# Patient Record
Sex: MALE | Race: WHITE | Employment: UNEMPLOYED | URBAN - METROPOLITAN AREA
[De-identification: names, ages, dates, MRNs, and addresses within clinical notes are randomized per-mention and may not be internally consistent; named-entity substitution may affect disease eponyms.]

---

## 2018-05-04 ENCOUNTER — HOSPITAL ENCOUNTER (INPATIENT)
Age: 38
LOS: 6 days | Discharge: HOME | DRG: 523 | End: 2018-05-10
Attending: EMERGENCY MEDICINE | Admitting: PSYCHIATRY & NEUROLOGY
Payer: COMMERCIAL

## 2018-05-04 DIAGNOSIS — R74.8 ELEVATED LIVER ENZYMES: ICD-10-CM

## 2018-05-04 DIAGNOSIS — F10.10 ALCOHOL ABUSE: Primary | ICD-10-CM

## 2018-05-04 DIAGNOSIS — E87.6 HYPOKALEMIA: ICD-10-CM

## 2018-05-04 LAB
ALBUMIN SERPL-MCNC: 4 G/DL (ref 3.4–5)
ALBUMIN/GLOB SERPL: 1.3 {RATIO} (ref 0.8–1.7)
ALP SERPL-CCNC: 97 U/L (ref 45–117)
ALT SERPL-CCNC: 66 U/L (ref 16–61)
AMPHET UR QL SCN: NEGATIVE
ANION GAP SERPL CALC-SCNC: 10 MMOL/L (ref 3–18)
APAP SERPL-MCNC: <2 UG/ML (ref 10–30)
AST SERPL-CCNC: 62 U/L (ref 15–37)
BARBITURATES UR QL SCN: NEGATIVE
BASOPHILS # BLD: 0 K/UL (ref 0–0.1)
BASOPHILS NFR BLD: 0 % (ref 0–2)
BENZODIAZ UR QL: NEGATIVE
BILIRUB SERPL-MCNC: 0.9 MG/DL (ref 0.2–1)
BUN SERPL-MCNC: 7 MG/DL (ref 7–18)
BUN/CREAT SERPL: 9 (ref 12–20)
CALCIUM SERPL-MCNC: 8.2 MG/DL (ref 8.5–10.1)
CANNABINOIDS UR QL SCN: POSITIVE
CHLORIDE SERPL-SCNC: 102 MMOL/L (ref 100–108)
CO2 SERPL-SCNC: 26 MMOL/L (ref 21–32)
COCAINE UR QL SCN: NEGATIVE
CREAT SERPL-MCNC: 0.8 MG/DL (ref 0.6–1.3)
DIFFERENTIAL METHOD BLD: ABNORMAL
EOSINOPHIL # BLD: 0 K/UL (ref 0–0.4)
EOSINOPHIL NFR BLD: 0 % (ref 0–5)
ERYTHROCYTE [DISTWIDTH] IN BLOOD BY AUTOMATED COUNT: 12.3 % (ref 11.6–14.5)
ETHANOL SERPL-MCNC: 139 MG/DL (ref 0–3)
GLOBULIN SER CALC-MCNC: 3.1 G/DL (ref 2–4)
GLUCOSE SERPL-MCNC: 147 MG/DL (ref 74–99)
HCT VFR BLD AUTO: 40.2 % (ref 36–48)
HDSCOM,HDSCOM: ABNORMAL
HGB BLD-MCNC: 14.5 G/DL (ref 13–16)
LYMPHOCYTES # BLD: 0.8 K/UL (ref 0.9–3.6)
LYMPHOCYTES NFR BLD: 12 % (ref 21–52)
MCH RBC QN AUTO: 33.4 PG (ref 24–34)
MCHC RBC AUTO-ENTMCNC: 36.1 G/DL (ref 31–37)
MCV RBC AUTO: 92.6 FL (ref 74–97)
METHADONE UR QL: NEGATIVE
MONOCYTES # BLD: 0.5 K/UL (ref 0.05–1.2)
MONOCYTES NFR BLD: 9 % (ref 3–10)
NEUTS SEG # BLD: 5 K/UL (ref 1.8–8)
NEUTS SEG NFR BLD: 79 % (ref 40–73)
OPIATES UR QL: NEGATIVE
PCP UR QL: NEGATIVE
PLATELET # BLD AUTO: 204 K/UL (ref 135–420)
PMV BLD AUTO: 9.3 FL (ref 9.2–11.8)
POTASSIUM SERPL-SCNC: 3.1 MMOL/L (ref 3.5–5.5)
PROT SERPL-MCNC: 7.1 G/DL (ref 6.4–8.2)
RBC # BLD AUTO: 4.34 M/UL (ref 4.7–5.5)
SALICYLATES SERPL-MCNC: <2.8 MG/DL (ref 2.8–20)
SODIUM SERPL-SCNC: 138 MMOL/L (ref 136–145)
WBC # BLD AUTO: 6.3 K/UL (ref 4.6–13.2)

## 2018-05-04 PROCEDURE — 80307 DRUG TEST PRSMV CHEM ANLYZR: CPT | Performed by: PHYSICIAN ASSISTANT

## 2018-05-04 PROCEDURE — 85025 COMPLETE CBC W/AUTO DIFF WBC: CPT | Performed by: PHYSICIAN ASSISTANT

## 2018-05-04 PROCEDURE — 74011250636 HC RX REV CODE- 250/636: Performed by: STUDENT IN AN ORGANIZED HEALTH CARE EDUCATION/TRAINING PROGRAM

## 2018-05-04 PROCEDURE — 96374 THER/PROPH/DIAG INJ IV PUSH: CPT

## 2018-05-04 PROCEDURE — 93005 ELECTROCARDIOGRAM TRACING: CPT

## 2018-05-04 PROCEDURE — 99285 EMERGENCY DEPT VISIT HI MDM: CPT

## 2018-05-04 PROCEDURE — 80053 COMPREHEN METABOLIC PANEL: CPT | Performed by: PHYSICIAN ASSISTANT

## 2018-05-04 PROCEDURE — 65220000005 HC RM SEMIPRIVATE PSYCH 3 OR 4 BED

## 2018-05-04 RX ORDER — LANOLIN ALCOHOL/MO/W.PET/CERES
100 CREAM (GRAM) TOPICAL DAILY
Status: DISCONTINUED | OUTPATIENT
Start: 2018-05-05 | End: 2018-05-10 | Stop reason: HOSPADM

## 2018-05-04 RX ORDER — HYDROXYZINE PAMOATE 50 MG/1
50 CAPSULE ORAL
Status: DISCONTINUED | OUTPATIENT
Start: 2018-05-04 | End: 2018-05-10 | Stop reason: HOSPADM

## 2018-05-04 RX ORDER — TRAZODONE HYDROCHLORIDE 50 MG/1
50 TABLET ORAL
Status: DISCONTINUED | OUTPATIENT
Start: 2018-05-04 | End: 2018-05-10 | Stop reason: HOSPADM

## 2018-05-04 RX ORDER — BENZTROPINE MESYLATE 1 MG/ML
1 INJECTION INTRAMUSCULAR; INTRAVENOUS
Status: DISCONTINUED | OUTPATIENT
Start: 2018-05-04 | End: 2018-05-10 | Stop reason: HOSPADM

## 2018-05-04 RX ORDER — CHLORDIAZEPOXIDE HYDROCHLORIDE 25 MG/1
25 CAPSULE, GELATIN COATED ORAL
Status: DISCONTINUED | OUTPATIENT
Start: 2018-05-04 | End: 2018-05-10 | Stop reason: HOSPADM

## 2018-05-04 RX ORDER — HALOPERIDOL 5 MG/ML
5 INJECTION INTRAMUSCULAR
Status: DISCONTINUED | OUTPATIENT
Start: 2018-05-04 | End: 2018-05-10 | Stop reason: HOSPADM

## 2018-05-04 RX ORDER — FOLIC ACID 1 MG/1
1 TABLET ORAL DAILY
Status: DISCONTINUED | OUTPATIENT
Start: 2018-05-05 | End: 2018-05-10 | Stop reason: HOSPADM

## 2018-05-04 RX ORDER — LORAZEPAM 2 MG/ML
1 INJECTION INTRAMUSCULAR ONCE
Status: COMPLETED | OUTPATIENT
Start: 2018-05-04 | End: 2018-05-04

## 2018-05-04 RX ORDER — POTASSIUM CHLORIDE 20 MEQ/1
40 TABLET, EXTENDED RELEASE ORAL
Status: DISPENSED | OUTPATIENT
Start: 2018-05-04 | End: 2018-05-05

## 2018-05-04 RX ORDER — BENZTROPINE MESYLATE 1 MG/1
1 TABLET ORAL
Status: DISCONTINUED | OUTPATIENT
Start: 2018-05-04 | End: 2018-05-10 | Stop reason: HOSPADM

## 2018-05-04 RX ORDER — CHLORDIAZEPOXIDE HYDROCHLORIDE 25 MG/1
50 CAPSULE, GELATIN COATED ORAL
Status: DISCONTINUED | OUTPATIENT
Start: 2018-05-04 | End: 2018-05-10 | Stop reason: HOSPADM

## 2018-05-04 RX ORDER — HALOPERIDOL 5 MG/1
5 TABLET ORAL
Status: DISCONTINUED | OUTPATIENT
Start: 2018-05-04 | End: 2018-05-10 | Stop reason: HOSPADM

## 2018-05-04 RX ADMIN — LORAZEPAM 1 MG: 2 INJECTION, SOLUTION INTRAMUSCULAR; INTRAVENOUS at 15:53

## 2018-05-04 NOTE — IP AVS SNAPSHOT
[address] Patient: Lucille Hoffman MRN: [id number] About your hospitalization You were admitted on: May 4, 2018 You last received care in the:  [facility] 1 ADULT CHEM DEP You were discharged on:  May 10, 2018 Why you were hospitalized Your primary diagnosis was:  Depressive Disorder Your diagnoses also included:  Alcohol Use Disorder, Severe, Dependence (Hcc), Alcohol Withdrawal, Uncomplicated (Hcc), Cannabis Use Disorder, Mild, Abuse, Tobacco Use Disorder, Moderate, Dependence Follow-up Information Follow up With Details Comments Contact Harmony OZUNA [address] On 5/14/2018 with Greta Moise LCSW at 3:45pm. Medication appointment will be made after initial appointment. [address] [address] 
[phone] Discharge Orders None A check eduardo indicates which time of day the medication should be taken. My Medications START taking these medications Instructions Each Dose to Equal  
 Morning Noon Evening Bedtime  
 acamprosate 333 mg tablet Commonly known as:  Donnamaria Ponds Your last dose was: Your next dose is: Take 1 Tab by mouth three (3) times daily. Indications: alcoholism 333 mg  
    
   
   
   
  
 folic acid 1 mg tablet Commonly known as:  Google Your last dose was: Your next dose is: Take 1 Tab by mouth daily. Indications: Folate Deficiency 1 mg  
    
   
   
   
  
 gabapentin 400 mg capsule Commonly known as:  NEURONTIN Your last dose was: Your next dose is: Take 1 Cap by mouth three (3) times daily. Indications: alcoholism 400 mg  
    
   
   
   
  
 hydrOXYzine pamoate 50 mg capsule Commonly known as:  VISTARIL Your last dose was: Your next dose is: Take 1 Cap by mouth three (3) times daily as needed for Anxiety for up to 14 days. Indications: anxiety 50 mg  
    
   
   
   
  
 multivitamin, tx-iron-ca-min 9 mg iron-400 mcg Tab tablet Commonly known as:  THERA-M w/ IRON Your last dose was: Your next dose is: Take 1 Tab by mouth daily. Indications: MINERAL DEFICIENCY PREVENTION  
 1 Tab  
    
   
   
   
  
 sertraline 100 mg tablet Commonly known as:  ZOLOFT Your last dose was: Your next dose is: Take 1 Tab by mouth nightly. Indications: ANXIETY WITH DEPRESSION  
 100 mg  
    
   
   
   
  
 thiamine 100 mg tablet Commonly known as:  B-1 Your last dose was: Your next dose is: Take 1 Tab by mouth daily. Indications: THIAMINE DEFICIENCY  
 100 mg  
    
   
   
   
  
 traZODone 50 mg tablet Commonly known as:  Rahul Bell Your last dose was: Your next dose is: Take 1 Tab by mouth nightly as needed for Sleep. Indications: insomnia associated with depression 50 mg Where to Get Your Medications Information on where to get these meds will be given to you by the nurse or doctor. ! Ask your nurse or doctor about these medications  
  acamprosate 766 mg tablet  
 folic acid 1 mg tablet  
 gabapentin 400 mg capsule  
 hydrOXYzine pamoate 50 mg capsule  
 multivitamin, tx-iron-ca-min 9 mg iron-400 mcg Tab tablet  
 sertraline 100 mg tablet  
 thiamine 100 mg tablet  
 traZODone 50 mg tablet Discharge Instructions BEHAVIORAL HEALTH NURSING DISCHARGE NOTE Emergency Numbers [facility] Desk: [phone] [facility] Emergency Services: [phone] Suicide Prevention Line: [phone] (TALK) The following personal items collected during your admission are returned to you:  
Dental Appliance: Dental Appliances: None Vision: Visual Aid: None Hearing Aid:   
 Jewelry: Jewelry: Necklace, Ring, With patient, Watch (locker 126/4) Clothing: Clothing: Shirt, Pants, Footwear Other Valuables: Other Valuables: Cell Phone, INFOGRAPHIQS (locker 126/4) Valuables sent to safe: Personal Items Sent to Safe: six credit cards, twenty dollar cash The discharge information has been reviewed with the patient. The patient verbalized understanding. Orggerhart Activation Thank you for requesting access to Work in Field. Please follow the instructions below to securely access and download your online medical record. Work in Field allows you to send messages to your doctor, view your test results, renew your prescriptions, schedule appointments, and more. How Do I Sign Up? In your internet browser, go to [email or web address] Click on the First Time User? Click Here link in the Sign In box. You will be redirect to the New Member Sign Up page. Enter your Work in Field Access Code exactly as it appears below. You will not need to use this code after youve completed the sign-up process. If you do not sign up before the expiration date, you must request a new code. Work in Field Access Code: [email or web address] Expires: 2018  2:15 PM (This is the date your Work in Field access code will ) Enter the last four digits of your Social Security Number (xxxx) and Date of Birth (mm/dd/yyyy) as indicated and click Submit. You will be taken to the next sign-up page. Create a Work in Field ID. This will be your Work in Field login ID and cannot be changed, so think of one that is secure and easy to remember. Create a Work in Field password. You can change your password at any time. Enter your Password Reset Question and Answer. This can be used at a later time if you forget your password. Enter your e-mail address. You will receive e-mail notification when new information is available in [address] Click Sign Up. You can now view and download portions of your medical record. Click the kontoblick link to download a portable copy of your medical information. Additional Information If you have questions, please visit the Frequently Asked Questions section of the [employer] Social Platform website at [email or web address] Remember, [employer] is NOT to be used for urgent needs. For medical emergencies, dial 911. Patient armband removed and shredded TransferWiseharLabtiva Announcement We are excited to announce that we are making your provider's discharge notes available to you in [employer] Social Platform. You will see these notes when they are completed and signed by the physician that discharged you from your recent hospital stay. If you have any questions or concerns about any information you see in [employer] Social Platform, please call the Health Information Department where you were seen or reach out to your Primary Care Provider for more information about your plan of care. Introducing [facility]! [employer] introduces [employer] Social Platform patient portal. Now you can access parts of your medical record, email your doctor's office, and request medication refills online. 1. In your internet browser, go to [email or web address] 2. Click on the First Time User? Click Here link in the Sign In box. You will see the New Member Sign Up page. 3. Enter your [employer] Platform Access Code exactly as it appears below. You will not need to use this code after youve completed the sign-up process. If you do not sign up before the expiration date, you must request a new code. · [employer] Access Code: [email or web address] Expires: 8/2/2018  2:15 PM 
 
4. Enter the last four digits of your Social Security Number (xxxx) and Date of Birth (mm/dd/yyyy) as indicated and click Submit. You will be taken to the next sign-up page. 5. Create a [employer] Platform ID. This will be your [employer] Platform login ID and cannot be changed, so think of one that is secure and easy to remember. 6. Create a [employer] password. You can change your password at any time. 7. Enter your Password Reset Question and Answer. This can be used at a later time if you forget your password. 8. Enter your e-mail address. You will receive e-mail notification when new information is available in [address] 9. Click Sign Up. You can now view and download portions of your medical record. 10. Click the Download Summary menu link to download a portable copy of your medical information. If you have questions, please visit the Frequently Asked Questions section of the [employer] website. Remember, [employer] is NOT to be used for urgent needs. For medical emergencies, dial 911. Now available from your iPhone and Android! Introducing Zaki Martinez As a Benuel Kamille patient, I wanted to make you aware of our electronic visit tool called Zaki Martinez. [facility] WordWatch 24/7 allows you to connect within minutes with a medical provider 24 hours a day, seven days a week via a mobile device or tablet or logging into a secure website from your computer. You can access Zaki Martinez from anywhere in the [place]. A virtual visit might be right for you when you have a simple condition and feel like you just dont want to get out of bed, or cant get away from work for an appointment, when your regular Benuel Kamille provider is not available (evenings, weekends or holidays), or when youre out of town and need minor care. Electronic visits cost only $49 and if the FiveCubits 24/7 provider determines a prescription is needed to treat your condition, one can be electronically transmitted to a nearby pharmacy*. Please take a moment to enroll today if you have not already done so. The enrollment process is free and takes just a few minutes. To enroll, please download the FiveCubits 24/PingCo.com jessenia to your tablet or phone, or visit [email or web address] to enroll on your computer. And, as an [address] [place] [address] patient with a Studio Whale account, the results of your visits will be scanned into your electronic medical record and your primary care provider will be able to view the scanned results. We urge you to continue to see your regular Murcathy Bardales provider for your ongoing medical care. And while your primary care provider may not be the one available when you seek a Zaki Riddlecassfin virtual visit, the peace of mind you get from getting a real diagnosis real time can be priceless. For more information on Zaki Bravofin, view our Frequently Asked Questions (FAQs) at [email or web address]. Sincerely, 
 
Rupesh Levy MD 
Chief Medical Officer [address] Clark *:  certain medications cannot be prescribed via Zaki Riddlecassfin Providers Seen During Your Hospitalization Provider Specialty Primary office phone Pascual Garcia MD Emergency Medicine [phone] Carlos Troncoso MD Psychiatry [phone] Your Primary Care Physician (PCP) Primary Care Physician Office Phone Office Fax NONE ** None ** ** None ** You are allergic to the following No active allergies Recent Documentation Height Weight BMI  
  
  
 1.829 m 61.2 kg 18.31 kg/m2 Emergency Contacts Name Discharge Info Relation Home Work Mobile Yariel KOHLI CAREGIVER [4] Mother [14] [phone] Patient Belongings The following personal items are in your possession at time of discharge: 
  Dental Appliances: None  Visual Aid: None      Home Medications: None   Jewelry: Necklace, Ring, With patient, Watch (locker 126/4)  Clothing: Shirt, Pants, Footwear    Other Valuables: Cell Phone, Misti Garg (locker 126/4)  Personal Items Sent to Safe: six credit cards, twenty dollar cash Please provide this summary of care documentation to your next provider. Signatures-by signing, you are acknowledging that this After Visit Summary has been reviewed with you and you have received a copy. Patient Signature:  ____________________________________________________________ Date:  ____________________________________________________________  
  
Gwendloyn Finger Provider Signature:  ____________________________________________________________ Date:  ____________________________________________________________

## 2018-05-04 NOTE — ED TRIAGE NOTES
Patient presents to the ED stating he needs to detox from alcohol. Patient's last drink was last night. Patient also smokes Annmarie Cooks  and takes ativan.

## 2018-05-04 NOTE — BH NOTES
Patient arrived to unit safely via wheelchair. VSS and A&Ox 4. Admission paperwork and treatment team process was reviewed with patient; signed in acknowledgement. Patient's belongings were inventoried and checked for safety; signed in agreement/acknowledgement by patient. Patient contracted for safety in regards to withdrawal process. Patient is a 39 y/o male with a hx of alcohol abuse and dependence; no further medical or psychiatric hx. Report spinal issue of his \" C5-6 rubbing and lower back pain\"; uses Ibuprofen and ice at home. Denied current legal issues; hx of DUIs over 9 years ago. Patient also attended Alcoholics anonymous at that time. Patient is not currently taking any home medications, but reports being previously on celexa. Patient reports increased depression and drinking after losing his job (worked 20 years) and his long term relationship. Patient denied SI/HI and AVH. Patient reports drinking a fifth to a ninth of vodka daily and marijuana use; UDS + for THC and Ethyl serum 139. Patient denies hx of seizures. Patient acknowledges his friends and family as a support system, and sees his new job (for 1 month) as inspiration for his sobriety.

## 2018-05-04 NOTE — ROUTINE PROCESS
TRANSFER - OUT REPORT:    Verbal report given to Alexsander Shaw RN(name) on Lesli Damian  being transferred to Adult Unit(unit) for routine progression of care       Report consisted of patients Situation, Background, Assessment and   Recommendations(SBAR). Information from the following report(s) SBAR, ED Summary, Intake/Output, MAR and Recent Results was reviewed with the receiving nurse. Lines:   Peripheral IV 05/04/18 Left Forearm (Active)   Site Assessment Clean, dry, & intact 5/4/2018  3:14 PM   Phlebitis Assessment 0 5/4/2018  3:14 PM   Infiltration Assessment 0 5/4/2018  3:14 PM   Dressing Status Clean, dry, & intact 5/4/2018  3:14 PM   Dressing Type Transparent 5/4/2018  3:14 PM   Hub Color/Line Status Flushed;Patent 5/4/2018  3:14 PM        Opportunity for questions and clarification was provided.       Patient transported with:   Tech   Patient kristina Smith

## 2018-05-04 NOTE — ED PROVIDER NOTES
EMERGENCY DEPARTMENT HISTORY AND PHYSICAL EXAM    2:23 PM      Date: 5/4/2018  Patient Name: Ailyn Villagran    History of Presenting Illness     Chief Complaint   Patient presents with    Alcohol Problem         History Provided By: Patient    Chief Complaint: alcohol withdrawal   Duration:  Hours  Timing:  Acute  Location: N/A  Quality: N/A  Severity: Moderate  Modifying Factors: alcohol  Associated Symptoms: denies any other associated signs or symptoms      Additional History (Context): Ailyn Villagran is a 40 y.o. male with alcohol abuse who presents with desire for alcohol detox. Drinks 1 pint to one half gallon of vodka a day and has for 5+ months, almost every day before that point. Denies active SI but states he drinks because he's depressed about his life, his job, and his relationship with his woman. Does agree to passive suicidal ideation. Does not own a gun, does not have prior SI or active plan. Denies HI. Endorses marijuana use but declines other drug use. PCP: None        Past History     Past Medical History:  No past medical history on file. Past Surgical History:  No past surgical history on file. Family History:  No family history on file. Social History:  Social History   Substance Use Topics    Smoking status: Not on file    Smokeless tobacco: Not on file    Alcohol use Not on file       Allergies:  No Known Allergies      Review of Systems       Review of Systems   Constitutional: Positive for fatigue. Negative for activity change, appetite change, chills, diaphoresis and fever. HENT: Negative for congestion, sneezing, sore throat, trouble swallowing and voice change. Eyes: Negative for photophobia. Respiratory: Negative for cough, chest tightness, shortness of breath, wheezing and stridor. Cardiovascular: Positive for palpitations. Negative for chest pain and leg swelling.    Gastrointestinal: Negative for abdominal distention, abdominal pain, blood in stool, constipation, diarrhea, nausea and vomiting. Endocrine: Negative for polydipsia and polyphagia. Genitourinary: Negative for difficulty urinating, flank pain, frequency and urgency. Musculoskeletal: Negative for gait problem and myalgias. Skin: Negative for pallor, rash and wound. Allergic/Immunologic: Negative for immunocompromised state. Neurological: Positive for tremors. Negative for dizziness, seizures, light-headedness, numbness and headaches. Hematological: Negative for adenopathy. Psychiatric/Behavioral: Positive for agitation. Physical Exam     Visit Vitals    BP (!) 142/94    Pulse (!) 112    Temp 98.9 °F (37.2 °C)    Resp 16    Ht 6' 1\" (1.854 m)    Wt 61.2 kg (135 lb)    SpO2 97%    BMI 17.81 kg/m2       Physical Exam   Constitutional: He is oriented to person, place, and time. No distress. HENT:   Head: Normocephalic and atraumatic. Mouth/Throat: No oropharyngeal exudate. Eyes: Pupils are equal, round, and reactive to light. Right eye exhibits no discharge. Left eye exhibits no discharge. Neck: Normal range of motion. No tracheal deviation present. Cardiovascular: Regular rhythm, normal heart sounds, intact distal pulses and normal pulses. Tachycardia present. Pulmonary/Chest: Effort normal. No stridor. No respiratory distress. He has no wheezes. He has no rales. He exhibits no tenderness. Abdominal: Soft. Bowel sounds are normal. He exhibits no distension. There is no tenderness. There is no rebound and no guarding. Musculoskeletal: Normal range of motion. He exhibits no edema, tenderness or deformity. Neurological: He is alert and oriented to person, place, and time. He displays normal reflexes. No cranial nerve deficit. Coordination normal.   Skin: Skin is warm and dry. No rash noted. He is not diaphoretic. Psychiatric: He has a normal mood and affect.          Diagnostic Study Results     Labs -  Recent Results (from the past 12 hour(s))   DRUG SCREEN, URINE Collection Time: 05/04/18  2:19 PM   Result Value Ref Range    BENZODIAZEPINES NEGATIVE  NEG      BARBITURATES NEGATIVE  NEG      THC (TH-CANNABINOL) POSITIVE (A) NEG      OPIATES NEGATIVE  NEG      PCP(PHENCYCLIDINE) NEGATIVE  NEG      COCAINE NEGATIVE  NEG      AMPHETAMINES NEGATIVE  NEG      METHADONE NEGATIVE  NEG      HDSCOM (NOTE)    EKG, 12 LEAD, INITIAL    Collection Time: 05/04/18  2:51 PM   Result Value Ref Range    Ventricular Rate 98 BPM    Atrial Rate 98 BPM    P-R Interval 134 ms    QRS Duration 108 ms    Q-T Interval 344 ms    QTC Calculation (Bezet) 439 ms    Calculated P Axis 71 degrees    Calculated R Axis 70 degrees    Calculated T Axis 64 degrees    Diagnosis       Normal sinus rhythm  Normal ECG  No previous ECGs available     CBC WITH AUTOMATED DIFF    Collection Time: 05/04/18  3:00 PM   Result Value Ref Range    WBC 6.3 4.6 - 13.2 K/uL    RBC 4.34 (L) 4.70 - 5.50 M/uL    HGB 14.5 13.0 - 16.0 g/dL    HCT 40.2 36.0 - 48.0 %    MCV 92.6 74.0 - 97.0 FL    MCH 33.4 24.0 - 34.0 PG    MCHC 36.1 31.0 - 37.0 g/dL    RDW 12.3 11.6 - 14.5 %    PLATELET 498 027 - 404 K/uL    MPV 9.3 9.2 - 11.8 FL    NEUTROPHILS 79 (H) 40 - 73 %    LYMPHOCYTES 12 (L) 21 - 52 %    MONOCYTES 9 3 - 10 %    EOSINOPHILS 0 0 - 5 %    BASOPHILS 0 0 - 2 %    ABS. NEUTROPHILS 5.0 1.8 - 8.0 K/UL    ABS. LYMPHOCYTES 0.8 (L) 0.9 - 3.6 K/UL    ABS. MONOCYTES 0.5 0.05 - 1.2 K/UL    ABS. EOSINOPHILS 0.0 0.0 - 0.4 K/UL    ABS.  BASOPHILS 0.0 0.0 - 0.1 K/UL    DF AUTOMATED     METABOLIC PANEL, COMPREHENSIVE    Collection Time: 05/04/18  3:00 PM   Result Value Ref Range    Sodium 138 136 - 145 mmol/L    Potassium 3.1 (L) 3.5 - 5.5 mmol/L    Chloride 102 100 - 108 mmol/L    CO2 26 21 - 32 mmol/L    Anion gap 10 3.0 - 18 mmol/L    Glucose 147 (H) 74 - 99 mg/dL    BUN 7 7.0 - 18 MG/DL    Creatinine 0.80 0.6 - 1.3 MG/DL    BUN/Creatinine ratio 9 (L) 12 - 20      GFR est AA >60 >60 ml/min/1.73m2    GFR est non-AA >60 >60 ml/min/1.73m2    Calcium 8.2 (L) 8.5 - 10.1 MG/DL    Bilirubin, total 0.9 0.2 - 1.0 MG/DL    ALT (SGPT) 66 (H) 16 - 61 U/L    AST (SGOT) 62 (H) 15 - 37 U/L    Alk. phosphatase 97 45 - 117 U/L    Protein, total 7.1 6.4 - 8.2 g/dL    Albumin 4.0 3.4 - 5.0 g/dL    Globulin 3.1 2.0 - 4.0 g/dL    A-G Ratio 1.3 0.8 - 1.7     SALICYLATE    Collection Time: 05/04/18  3:00 PM   Result Value Ref Range    Salicylate level <9.3 (L) 2.8 - 20.0 MG/DL   ACETAMINOPHEN    Collection Time: 05/04/18  3:00 PM   Result Value Ref Range    Acetaminophen level <2 (L) 10.0 - 30.0 ug/mL   ETHYL ALCOHOL    Collection Time: 05/04/18  3:00 PM   Result Value Ref Range    ALCOHOL(ETHYL),SERUM 139 (H) 0 - 3 MG/DL       Radiologic Studies -   No orders to display         Medical Decision Making   I am the first provider for this patient. I reviewed the vital signs, available nursing notes, past medical history, past surgical history, family history and social history. Vital Signs-Reviewed the patient's vital signs. Pulse Oximetry Analysis -  99 on room air     Cardiac Monitor:  Rate: 100s  Rhythm:  Sinus Tachycardia    EKG: Interpreted by the EP. No acute ischemic changes. Records Reviewed: Nursing Notes (Time of Review: 2:23 PM)    ED Course: Progress Notes, Reevaluation, and Consults:    Provider Notes (Medical Decision Making): 39 yo M without significant PMH but with chronic alcohol use and abuse (1 pint vodka daily for 6+ months) no prior history of complex withdrawal presenting with desire for detox in the setting of stopping EtOH last night (last drink ~13 hours prior.)     Will eval with CBC, CMP, EtOH, UDS, tylenol and aspirin levels, ECG. Treat symptomatically with ativan. Given no history of complex withdrawal, no cardiac disease, younger age, no major electrolyte abnormalities, significant GI or renal path, suspect this patient is low risk for complex withdrawal syndrome. 5:17 PM  Labs notable for mild hypoK, EtOH 140s. K repleted orally. Patient to be admitted by psych. Medically stable. Spoke to Dr. Aamir Ty about oral repletion and possibly adding oral potassium to diet if still low on recheck. For Hospitalized Patients:    1. Hospitalization Decision Time:  The decision to hospitalize the patient was made by Dr. Fabiola Tee at [phone] on 5/4/2018      Diagnosis     Clinical Impression:   1. Alcohol abuse    2. Hypokalemia    3.  Elevated liver enzymes        Disposition: Admission, Psych    Follow-up Information     None           Patient's Medications    No medications on file     _______________________________

## 2018-05-04 NOTE — BSMART NOTE
Comprehensive Assessment Form Part 1    Section I - Disposition      The Medical Doctor is in agreement with Psychiatrist disposition because patient is depressed and alcohol abuse  The plan is to admit to [facility] Unit, Room 126-2  The on-call Psychiatrist consulted was Dr. Kvng Martinez  The admitting Psychiatrist will be Dr. Kvng Martinez  The admitting Diagnosis is Depression and Alcohol Detox. Section II - Integrated Summary  Summary: This patient is a 35year old male patient who presented to the ED with reports of depression and drinking up to a fifth of Vodka daily since January. He says he lost his job of almost 20 years in January and had a recent breakup with his girlfriend. Patient is lying in hospital bed with a flat, dull and depressed affect. Alert and oriented x 3. He was cooperative and did not initially disclose his history of depression in which he was taking Celexa prescribed by his PCP, Dr. Eliezer Monroe, a year ago which is when he also stopped taking the medications. He denies suicidal or homicidal ideations. Denies auditory and visual hallucinations. He reports when he does not drink he gets the shakes really bad. Denies sweats, seizures or blackouts. Also, uses marijuana and Ativan occasionally. The patient is deemed competent to provide informed consent. The information is given by the patient. The Chief Complaint is depression and alcohol detox. The Precipitant Factors are increased drinking since January with multiple stressors. Previous Hospitalizations: Denies. The patient has not previously been in restraints. Current Psychiatrist and/or  is None. Lethality Assessment:    The potential for suicide is noted by the following: not noted. The potential for homicide is not noted. The patient has not been a perpetrator of sexual or physical abuse. There are not pending charges. The patient is not felt to be at risk for self harm or harm to others.       Section III - Psychosocial  The patient's overall mood and attitude is depressed, sad and flat affect. Feelings of helplessness and hopelessness are observed by verbalization of \"not knowing what else to and not able to deal with stressors right now. \". Generalized anxiety is not observed. Panic is not observed. Phobias are not observed. Obsessive compulsive tendencies are not observed. Section IV - Mental Status Exam  The patient's appearance shows no evidence of impairment. The patient's behavior is guarded and displays minimal tremors. The patient is oriented to time, place, person and situation. The patient's speech shows no evidence of impairment. The patient's mood  is depressed, is sad and displays anhedonia. The range of affect is flat. The patient's thought content  demonstrates no evidence of impairment. The thought process shows no evidence of impairment. The patient's perception shows no evidence of impairment. The patient's memory shows no evidence of impairment. The patient's appetite is decreased per patient o one meal a day maybe. The patient's sleep has evidence of insomnia. The patient's insight shows no evidence of impairment. The patient's judgement shows no evidence of impairment. Section V - Substance Abuse  The patient is using substances. The patient is using alcohol for 1-5 years with last use on 5/4/18, cannabis orally for 1-5 years with last use on 5/3/18 and benzodiazepines/barbiturates orally for 1-5 years with last use on 5/4/18. The patient has experienced the following withdrawal symptoms,  tremors and cravings. Section VI - Living Arrangements  The patient is single. The patient lives alone. The patient has no children. The patient does plan to return home upon discharge. The patient does not have legal issues pending. The patient's source of income comes from unknown. Roman Catholic and cultural practices have not been voiced at this time.     The patient's greatest support comes from friends and this person will not be involved with the treatment. The patient has not been in an event described as horrible or outside the realm of ordinary life experience either currently or in the past.  The patient has not been a victim of sexual/physical abuse. Section VII - Other Areas of Clinical Concern  The highest grade achieved is 12th and some college. The patient is currently  unemployed and speaks [place] as a primary language. The patient has no communication impairments affecting communication. The patient's preference for learning can be described as: can read and write adequately.   The patient's hearing is normal.  The patient's vision is normal .      Lauri Carter RN

## 2018-05-04 NOTE — IP AVS SNAPSHOT
Summary of Care Report The Summary of Care report has been created to help improve care coordination. Users with access to [employer] or [address] (Web-based application) may access additional patient information including the Discharge Summary. If you are not currently a [employer] user and need more information, please call the number listed below in the [place] [address] section and ask to be connected with Medical Records. 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  
  Alcohol withdrawal, uncomplicated ([address]  3/3/9341 - Present 2018 by Rodrigue Teixeira MD Yes Entered by Rodrigue Teixeira MD  
  Cannabis use disorder, mild, abuse  2018 - Present 2018 by Rodrigue Teixeira MD Yes Entered by Rodrigue Teixeira MD  
  Tobacco use disorder, moderate, dependence  2018 - Present 2018 by Rodrigue Teixeira MD Yes Entered by Rodrigue Teixeira MD  
  * (Principal)Depressive disorder  2018 - Present 2018 by Rodrigue Teixeira MD Yes Entered by Rodrigue Teixeira MD  
  
You are allergic to the following No active allergies Current Discharge Medication List  
  
START taking these medications Dose & Instructions Dispensing Information Comments  
 acamprosate 333 mg tablet Commonly known as:  Blanca Hy Dose:  333 mg Take 1 Tab by mouth three (3) times daily. Indications: alcoholism Quantity:  90 Tab Refills:  0  
   
 folic acid 1 mg tablet Commonly known as:  Google Dose:  1 mg Take 1 Tab by mouth daily. Indications: Folate Deficiency Quantity:  30 Tab Refills:  0  
   
 gabapentin 400 mg capsule Commonly known as:  NEURONTIN Dose:  400 mg Take 1 Cap by mouth three (3) times daily. Indications: alcoholism Quantity:  90 Cap Refills:  0  
   
 hydrOXYzine pamoate 50 mg capsule Commonly known as:  VISTARIL Dose:  50 mg Take 1 Cap by mouth three (3) times daily as needed for Anxiety for up to 14 days. Indications: anxiety Quantity:  45 Cap Refills:  0  
   
 multivitamin, tx-iron-ca-min 9 mg iron-400 mcg Tab tablet Commonly known as:  THERA-M w/ IRON Dose:  1 Tab Take 1 Tab by mouth daily. Indications: MINERAL DEFICIENCY PREVENTION Quantity:  30 Tab Refills:  0  
   
 sertraline 100 mg tablet Commonly known as:  ZOLOFT Dose:  100 mg Take 1 Tab by mouth nightly. Indications: ANXIETY WITH DEPRESSION Quantity:  30 Tab Refills:  0  
   
 thiamine 100 mg tablet Commonly known as:  B-1 Dose:  100 mg Take 1 Tab by mouth daily. Indications: THIAMINE DEFICIENCY Quantity:  30 Tab Refills:  0  
   
 traZODone 50 mg tablet Commonly known as:  Teryl [place] Dose:  50 mg Take 1 Tab by mouth nightly as needed for Sleep. Indications: insomnia associated with depression Quantity:  30 Tab Refills:  0 Follow-up Information Follow up With Details Comments Contact Info SULMA Hubbard On 5/14/2018 with Burlene Gilford LCSW at 3:45pm. Medication appointment will be made after initial appointment. [address] [address] 
[phone] Discharge Instructions BEHAVIORAL HEALTH NURSING DISCHARGE NOTE Emergency Numbers [facility] Desk: [phone] [facility]: [phone] Suicide Prevention Line: [phone] (TALK) The following personal items collected during your admission are returned to you:  
Dental Appliance: Dental Appliances: None Vision: Visual Aid: None Hearing Aid:   
Jewelry: Jewelry: [place] Brush, With patient, Watch (locker 126/4) Clothing: Clothing: Shirt, Pants, Footwear Other Valuables: Other Valuables: Cell Phone, [place]  (locker 126/4) Valuables sent to safe: Personal Items Sent to Safe: six credit cards, twenty dollar cash The discharge information has been reviewed with the patient. The patient verbalized understanding. [employer] Activation Thank you for requesting access to [employer]. Please follow the instructions below to securely access and download your online medical record. [employer] allows you to send messages to your doctor, view your test results, renew your prescriptions, schedule appointments, and more. How Do I Sign Up? In your internet browser, go to [email or web address] Click on the First Time User? Click Here link in the Sign In box. You will be redirect to the New Member Sign Up page. Enter your [employer] Access Code exactly as it appears below. You will not need to use this code after youve completed the sign-up process. If you do not sign up before the expiration date, you must request a new code. [employer] Access Code: [email or web address] Expires: 2018  2:15 PM (This is the date your [employer] access code will ) Enter the last four digits of your Social Security Number (xxxx) and Date of Birth (mm/dd/yyyy) as indicated and click Submit. You will be taken to the next sign-up page. Create a [employer] ID.  This will be your [employer] login ID and cannot be changed, so think of one that is secure and easy to remember. Create a [employer] password. You can change your password at any time. Enter your Password Reset Question and Answer. This can be used at a later time if you forget your password. Enter your e-mail address. You will receive e-mail notification when new information is available in [address] Click Sign Up. You can now view and download portions of your medical record. Click the [employer] link to download a portable copy of your medical information. Additional Information If you have questions, please visit the Frequently Asked Questions section of the [employer] website at [email or web address] Remember, [employer] is NOT to be used for urgent needs. For medical emergencies, dial 911. Patient armband removed and shredded Chart Review Routing History No Routing History on File

## 2018-05-05 PROBLEM — F10.930 ALCOHOL WITHDRAWAL, UNCOMPLICATED (HCC): Status: ACTIVE | Noted: 2018-05-05

## 2018-05-05 PROBLEM — F10.20 ALCOHOL USE DISORDER, SEVERE, DEPENDENCE (HCC): Chronic | Status: ACTIVE | Noted: 2018-05-04

## 2018-05-05 PROBLEM — F32.A DEPRESSIVE EPISODE: Status: ACTIVE | Noted: 2018-05-05

## 2018-05-05 PROBLEM — F12.10 CANNABIS USE DISORDER, MILD, ABUSE: Status: ACTIVE | Noted: 2018-05-05

## 2018-05-05 PROBLEM — F17.200 TOBACCO USE DISORDER, MODERATE, DEPENDENCE: Status: ACTIVE | Noted: 2018-05-05

## 2018-05-05 LAB
ATRIAL RATE: 98 BPM
CALCULATED P AXIS, ECG09: 71 DEGREES
CALCULATED R AXIS, ECG10: 70 DEGREES
CALCULATED T AXIS, ECG11: 64 DEGREES
DIAGNOSIS, 93000: NORMAL
P-R INTERVAL, ECG05: 134 MS
Q-T INTERVAL, ECG07: 344 MS
QRS DURATION, ECG06: 108 MS
QTC CALCULATION (BEZET), ECG08: 439 MS
VENTRICULAR RATE, ECG03: 98 BPM

## 2018-05-05 PROCEDURE — 74011250637 HC RX REV CODE- 250/637: Performed by: PSYCHIATRY & NEUROLOGY

## 2018-05-05 PROCEDURE — 65220000005 HC RM SEMIPRIVATE PSYCH 3 OR 4 BED

## 2018-05-05 RX ORDER — GABAPENTIN 100 MG/1
100 CAPSULE ORAL 3 TIMES DAILY
Status: DISCONTINUED | OUTPATIENT
Start: 2018-05-05 | End: 2018-05-06

## 2018-05-05 RX ORDER — IBUPROFEN 600 MG/1
600 TABLET ORAL
Status: DISCONTINUED | OUTPATIENT
Start: 2018-05-05 | End: 2018-05-10 | Stop reason: HOSPADM

## 2018-05-05 RX ORDER — IBUPROFEN 200 MG
1 TABLET ORAL DAILY
Status: DISCONTINUED | OUTPATIENT
Start: 2018-05-05 | End: 2018-05-05

## 2018-05-05 RX ORDER — ACETAMINOPHEN 500 MG
500 TABLET ORAL
Status: DISCONTINUED | OUTPATIENT
Start: 2018-05-05 | End: 2018-05-10 | Stop reason: HOSPADM

## 2018-05-05 RX ORDER — IBUPROFEN 200 MG
1 TABLET ORAL DAILY
Status: DISCONTINUED | OUTPATIENT
Start: 2018-05-05 | End: 2018-05-10 | Stop reason: HOSPADM

## 2018-05-05 RX ADMIN — CHLORDIAZEPOXIDE HYDROCHLORIDE 25 MG: 25 CAPSULE ORAL at 13:28

## 2018-05-05 RX ADMIN — IBUPROFEN 600 MG: 600 TABLET ORAL at 18:35

## 2018-05-05 RX ADMIN — Medication 100 MG: at 08:08

## 2018-05-05 RX ADMIN — CHLORDIAZEPOXIDE HYDROCHLORIDE 50 MG: 25 CAPSULE ORAL at 08:11

## 2018-05-05 RX ADMIN — MULTIPLE VITAMINS W/ MINERALS TAB 1 TABLET: TAB at 08:08

## 2018-05-05 RX ADMIN — FOLIC ACID 1 MG: 1 TABLET ORAL at 08:08

## 2018-05-05 RX ADMIN — HYDROXYZINE PAMOATE 50 MG: 50 CAPSULE ORAL at 08:11

## 2018-05-05 RX ADMIN — GABAPENTIN 100 MG: 100 CAPSULE ORAL at 20:28

## 2018-05-05 RX ADMIN — GABAPENTIN 100 MG: 100 CAPSULE ORAL at 13:28

## 2018-05-05 RX ADMIN — CHLORDIAZEPOXIDE HYDROCHLORIDE 25 MG: 25 CAPSULE ORAL at 20:32

## 2018-05-05 NOTE — BH NOTES
GROUP THERAPY PROGRESS NOTE    Didi Gonzalez is participating in Recreational Therapy. Group time: 45 minutes    Personal goal for participation: Relaxation     Goal orientation: social    Group therapy participation: active    Impression of participation: Pt did participate in group although when outside stayed to self resting on bench. Did appreciate being able to go outside and get some fresh air.

## 2018-05-05 NOTE — BH NOTES
GROUP THERAPY PROGRESS NOTE    Chavez Parnell is participating in [place]. Group time: 25 minutes    Personal goal for participation: discussed unit guidelines    Goal orientation: personal    Group therapy participation: minimal    Therapeutic interventions reviewed and discussed:     Impression of participation: Voiced no major concerns at this time. Only asked when he wouyld be seeing his doctor.

## 2018-05-05 NOTE — PROGRESS NOTES
Problem: Falls - Risk of  Goal: *Absence of Falls  Document Dick Fall Risk and appropriate interventions in the flowsheet. Outcome: Progressing Towards Goal  Fall Risk Interventions:  Medication Interventions: Teach patient to arise slowly  No falls reported/observed    Problem: Alcohol Withdrawal  Goal: *STG: Seeks staff when symptoms of withdrawal increase  Will be assessed by staff each daily, and seek staff, for increased signs and symptoms of withdrawal during hospital stay. Outcome: Progressing Towards Goal  Patient reported shaking, nausea  Goal: *Remains safe in hospital  Will monitored on a daily basis for safety during withdrawal process during hospitalization. Outcome: Progressing Towards Goal  No unsafe behaviors    Comments: Patient ate breakfast and lunch and was compliant with medications. Patient received librium and vistaril for withdrawal symptoms (see flowsheet and MAR). He complained of shaking, anxiety, and agitation. He denied SI/HI/AVH. He stated he slept okay but had vivid dreams. Patient is calm, cooperative and compliant with the unit rules and protocols and interacting minimally with select peers, staff and nurses. Staff will continue to monitor q15 minutes for safety. Staff and nurses will continue to provide a safe and supportive environment.

## 2018-05-05 NOTE — BH NOTES
Lesly Martinez is not participating in [facility]. Group time: 1900    Personal goal for participation: Repairs. Community Concerns    Goal orientation: personal    Group therapy participation: na    Therapeutic interventions reviewed and discussed: Staff encouraged Pt to report repairs and Community concerns    Impression of participation: Pt  Being admitted to the unit
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
## 2018-05-05 NOTE — PROGRESS NOTES
NUTRITION    Nutrition Screen      RECOMMENDATIONS / PLAN:     - Add supplement: Ensure Enlive TID.  - Continue RD inpatient monitoring and evaluation. NUTRITION DIAGNOSIS & INTERVENTIONS:     [x] Meals/snacks: general/healthful diet   [x] Medical food supplement therapy: initiate     Nutrition Diagnosis:  Unintended weight loss related to poor appetite, inadequate energy intake as evidenced by 30-35 lb, 18-21% weight loss x 6 months PTA. Patient meets criteria for Severe Protein Calorie Malnutrition as evidenced by:   ASPEN Malnutrition Criteria  Acute Illness, Chronic Illness, or Social/Enviornmental: Chronic illness  Energy Intake: <75% est energy req for greater than/equal to 1 month  Weight Loss: Greater than 10% x 6 mos  ASPEN Malnutrition Score - Chronic Illness: 7  Chronic Illness - Malnutrition Diagnosis: Severe malnutrition. ASSESSMENT:      Appetite improved. Agreeable to supplements. Average intake adequate to meet patients estimated nutritional needs:   [] Yes     [] No      [x] Unable to determine at this time    Diet: DIET REGULAR    Food Allergies: NKFA  Current Appetite:   [] Good     [x] Fair, improved     [] Poor     [] Other:  Appetite/meal intake prior to admission:   [] Good     [] Fair     [x] Poor x 3 months PTA, variable fair to poor x last 6 months    [] Other:   Feeding Limitations:  [] Swallowing Difficulty       [] Chewing Difficulty       [] Other   Current Meal Intake: No data found. Gastrointestinal Issues:  [x] Yes, nausea earlier    [] No   Skin Integrity:  WDL    Pertinent Medications:  Reviewed: thiamine, folic acid, MVI with iron   Labs:  Reviewed     Anthropometrics:  Ht Readings from Last 1 Encounters:   05/04/18 6' (1.829 m)       Last 3 Recorded Weights in this Encounter    05/04/18 1417 05/04/18 1930   Weight: 61.2 kg (135 lb) 61.2 kg (135 lb)       Body mass index is 18.31 kg/(m^2).   Underweight     Weight History: patient reports 30-35 lb (18-21%) weight loss over the past 6 months PTA     Weight Metrics 5/4/2018   Weight 135 lb   BMI 18.31 kg/m2       Admitting Diagnosis: Alcohol abuse  Past Medical History:   Diagnosis Date    Alcohol use disorder, severe, dependence ([facility] [address] 5/4/2018    Alcohol withdrawal, uncomplicated ([facility] [address] 9/5/0718    Cannabis use disorder, mild, abuse 5/5/2018    Depressive episode 5/5/2018    Tobacco use disorder, moderate, dependence 5/5/2018        Education Needs:        [x] None identified  [] Identified - Not appropriate at this time  []  Identified and addressed - refer to education log  Learning Limitations:   [x] None identified  [] Identified    Cultural, Methodist & ethnic food preferences identified:  [x] None    [] Yes      ESTIMATED NUTRITION NEEDS:     4342-7754 kcal (MSJx1.2-1.3), 49-61 gm protein (0.8-1 gm/kg), 1 mL/kcal  Based on: 61 kg       [x] Actual BW      [] IBW          MONITORING & EVALUATION:     Nutrition Goal(s):   1. Po intake of meals will meet >75% of patient estimated nutritional needs within the next 7 days.   Outcome:   [] Met    []  Not Met   [x] New/Initial Goal    Monitor:  [x] Food and beverage intake   [x] Diet order   [x] Nutrition-focused physical findings   [] Weight      Previous Recommendations (for follow-up assessments only):     []   Implemented       []   Not Implemented (RD to address)   [] No Longer Appropriate   [] No Recommendation Made       Discharge Planning: regular diet   [x]  Participated in care planning, discharge planning, & interdisciplinary rounds as appropriate      Hira Panda, [address], [address] [place] [address]    Pager: [phone]

## 2018-05-06 PROCEDURE — 65220000005 HC RM SEMIPRIVATE PSYCH 3 OR 4 BED

## 2018-05-06 PROCEDURE — 74011250637 HC RX REV CODE- 250/637: Performed by: PSYCHIATRY & NEUROLOGY

## 2018-05-06 RX ORDER — GABAPENTIN 100 MG/1
200 CAPSULE ORAL 3 TIMES DAILY
Status: DISCONTINUED | OUTPATIENT
Start: 2018-05-06 | End: 2018-05-07

## 2018-05-06 RX ORDER — GABAPENTIN 100 MG/1
100 CAPSULE ORAL
Status: COMPLETED | OUTPATIENT
Start: 2018-05-06 | End: 2018-05-06

## 2018-05-06 RX ADMIN — GABAPENTIN 200 MG: 100 CAPSULE ORAL at 14:39

## 2018-05-06 RX ADMIN — GABAPENTIN 200 MG: 100 CAPSULE ORAL at 20:27

## 2018-05-06 RX ADMIN — CHLORDIAZEPOXIDE HYDROCHLORIDE 25 MG: 25 CAPSULE ORAL at 08:18

## 2018-05-06 RX ADMIN — IBUPROFEN 600 MG: 600 TABLET ORAL at 12:34

## 2018-05-06 RX ADMIN — TRAZODONE HYDROCHLORIDE 50 MG: 50 TABLET ORAL at 20:28

## 2018-05-06 RX ADMIN — HYDROXYZINE PAMOATE 50 MG: 50 CAPSULE ORAL at 12:34

## 2018-05-06 RX ADMIN — FOLIC ACID 1 MG: 1 TABLET ORAL at 08:16

## 2018-05-06 RX ADMIN — MULTIPLE VITAMINS W/ MINERALS TAB 1 TABLET: TAB at 08:17

## 2018-05-06 RX ADMIN — Medication 100 MG: at 08:16

## 2018-05-06 RX ADMIN — GABAPENTIN 100 MG: 100 CAPSULE ORAL at 08:17

## 2018-05-06 RX ADMIN — GABAPENTIN 100 MG: 100 CAPSULE ORAL at 09:14

## 2018-05-06 NOTE — PROGRESS NOTES
[address] [facility] [address]  Inpatient Progress Note     Date of Service: 05/06/18  Hospital Day: 2     Subjective/Interval History   05/06/18    Treatment Team Notes:  Notes reviewed and/or discussed and report that Hugo Owens is sleeping well but had vivid dreams. CIWA: 9/6/7. Patient interview: Hugo Owens was interviewed by this writer today. Pt feels he is improving. States he had vivid dreams last night but slept well overall. Reports feeling anxious. Pt denies gabapentin caused daytime fatigue. Pt is medication compliant and denies medication side effects including TD, EPS, akathisia and mood derangements. Denies SI, HI and AVH. Objective     Visit Vitals    /86 (BP 1 Location: Right arm, BP Patient Position: Sitting)    Pulse 86    Temp 98.6 °F (37 °C)    Resp 16    Ht 6' (1.829 m)    Wt 61.2 kg (135 lb)    SpO2 96%    BMI 18.31 kg/m2       Mental Status Examination     Appearance/Hygiene 40 y.o.  CM with good hygiene   Behavior/Social Relatedness Appropriate  Relatedness is good   Musculoskeletal Gait/Station: appropriate  Tone (flaccid, cogwheeling, spastic): not assessed  Psychomotor (hyperkinetic, hypokinetic): appropriate   Involuntary movements (tics, dyskinesias, akathisia, stereotypies): mildly tremulous   Speech                          Rate, rhythm, volume, fluency and articulation are appropriate   Mood                          euthymic   Affect                                                   euthymic   Thought Process Linear and goal directed     Vagueness, incoherence, circumstantiality, tangentiality, neologisms, perseveration, flight of ideas, or self-contradictory statements not present on assessment   Thought Content and Perceptual Disturbances Denies delusions, ideas of reference, overvalued ideas, ruminations, obsession, compulsions, and phobias     Denies self-injurious behavior (SIB), suicidal ideation (SI), aggressive behavior or homicidal ideation (HI)     Denies auditory and visual hallucinations   Sensorium and Cognition              AOx4, attention intact, memory intact, language use appropriate, and fund of knowledge age appropriate   Insight              fair   Judgment fair         Assessment/Plan      Psychiatric Diagnoses:        Patient Active Problem List   Diagnosis Code    Alcohol use disorder, severe, dependence ([facility] [address] F10.20    Alcohol withdrawal, uncomplicated ([facility] [address] Y27.458    Cannabis use disorder, mild, abuse F12.10    Tobacco use disorder, moderate, dependence F17.200    Depressive disorder F32.9      Medical Diagnoses: Elevated LFTs      Psychosocial and contextual factors:                         1.  Alcohol use disorder                        2.  Recently ended relationship                                   3.  Unemployed      Level of impairment/disability: moderate    Virl Ax is a 40 y.o. who is currently improving. Pt continues to have some withdrawals including tremors. Pt reports vivid dreams last night. Pt denies SI, HI and AVH. 1.  Alcoholism and withdrawal   - increase gabapentin to 200mg po TID   - Continue CIWA and other medications as prescribed. 2.  Reviewed instructions, risks, benefits and side effects of medications  3.   Disposition/Discharge Date: self-care/home, 5-    Idania Raman MD [facility]  Psychiatry

## 2018-05-06 NOTE — BH NOTES
GROUP THERAPY PROGRESS NOTE    Lesli Damian is participating in [facility]. Group time: 25 minutes    Personal goal for participation: Unit guidelines and Inappropriate behavior. Goal orientation: personal    Group therapy participation: minimal    Therapeutic interventions reviewed and discussed: Encouraged to participate in treatment   and to talk with staff or doctor regarding any issues. Impression of participation: Pt sat quietly in group without any issues or complaints.

## 2018-05-06 NOTE — PROGRESS NOTES
Problem: Falls - Risk of  Goal: *Absence of Falls  Document Dick Fall Risk and appropriate interventions in the flowsheet. Outcome: Progressing Towards Goal  Fall Risk Interventions:  Medication Interventions: Teach patient to arise slowly  Pt remains free of falls. Problem: Chemical Dependency (Adult/Pediatric)  Goal: *STG: Will identify negative impact of chemical dependency including the use of tobacco, alcohol, and other substances  Will identify negative impact of chemical dependency on a daily basis during hospital stay. Outcome: Progressing Towards Goal  Pt speaks of quitting and understands its importance. Goal: *STG: Verbalizes abstinence as an achievable goal  Will verbalize abstinence as an achievable goal, and steps needed to maintain long term sobriety, on a daily basis during hospital stay. Outcome: Progressing Towards Goal  Pt states he is ready to quit. Comments: Patient has been mainly in the milieu today watching TV and socializing with peers. Patient is pleasant, calm and cooperative and states that he is finally ready for help with his drinking. Patient states that he knew he needed help when he started drinking so much and finally came in to get help. Patient is compliant with medications and denies SI/HI and AVH. Patient was medicated with Librium with morning meds for a CIWA of 7 but stayed in the milieu all morning.

## 2018-05-07 PROCEDURE — 74011250637 HC RX REV CODE- 250/637: Performed by: PSYCHIATRY & NEUROLOGY

## 2018-05-07 PROCEDURE — 65220000005 HC RM SEMIPRIVATE PSYCH 3 OR 4 BED

## 2018-05-07 RX ORDER — SERTRALINE HYDROCHLORIDE 50 MG/1
50 TABLET, FILM COATED ORAL
Status: DISCONTINUED | OUTPATIENT
Start: 2018-05-08 | End: 2018-05-08

## 2018-05-07 RX ORDER — SERTRALINE HYDROCHLORIDE 50 MG/1
25 TABLET, FILM COATED ORAL 2 TIMES DAILY
Status: COMPLETED | OUTPATIENT
Start: 2018-05-07 | End: 2018-05-07

## 2018-05-07 RX ORDER — GABAPENTIN 300 MG/1
300 CAPSULE ORAL 3 TIMES DAILY
Status: DISCONTINUED | OUTPATIENT
Start: 2018-05-07 | End: 2018-05-08

## 2018-05-07 RX ADMIN — ACETAMINOPHEN 500 MG: 500 TABLET, FILM COATED ORAL at 14:02

## 2018-05-07 RX ADMIN — CHLORDIAZEPOXIDE HYDROCHLORIDE 25 MG: 25 CAPSULE ORAL at 08:30

## 2018-05-07 RX ADMIN — GABAPENTIN 300 MG: 300 CAPSULE ORAL at 20:32

## 2018-05-07 RX ADMIN — SERTRALINE HYDROCHLORIDE 25 MG: 50 TABLET ORAL at 20:31

## 2018-05-07 RX ADMIN — SERTRALINE HYDROCHLORIDE 25 MG: 50 TABLET ORAL at 11:10

## 2018-05-07 RX ADMIN — FOLIC ACID 1 MG: 1 TABLET ORAL at 08:24

## 2018-05-07 RX ADMIN — TRAZODONE HYDROCHLORIDE 50 MG: 50 TABLET ORAL at 20:32

## 2018-05-07 RX ADMIN — Medication 100 MG: at 08:24

## 2018-05-07 RX ADMIN — GABAPENTIN 200 MG: 100 CAPSULE ORAL at 08:24

## 2018-05-07 RX ADMIN — GABAPENTIN 300 MG: 300 CAPSULE ORAL at 14:02

## 2018-05-07 RX ADMIN — IBUPROFEN 600 MG: 600 TABLET ORAL at 08:30

## 2018-05-07 RX ADMIN — MULTIPLE VITAMINS W/ MINERALS TAB 1 TABLET: TAB at 08:24

## 2018-05-07 NOTE — BSMART NOTE
SOCIAL WORK GROUP THERAPY PROGRESS NOTE    Group Time:  11am    Group Topic:  Coping Skills    C D Issues    Group Participation:      Pt moderately involved during group discussion but remained attentive. Members discussed handout on the role of \"neurotransmitters\" and how they regulate different aspects of one's moods, cognitions & related behavior. Emphasis of session was presentation of basic \"CBT\" principles including diagram of the process, as well as list of typical cognitive distortions. Taught client about relationship between mood disorders & self medicating them. Magan Thomas

## 2018-05-07 NOTE — BSMART NOTE
OCCUPATIONAL THERAPY PROGRESS NOTE  Group Time:  5140  Attendance: The patient attended full group. .  Participation:  The patient participated fully in the activity. Attention:  The patient was able to focus on the activity. Interaction:  The patient frequently interacts with others. Discussed substance abuse, previous sobriety attempts, having attended AA in the past, encourage to attend and get a sponsor this time. Able to set goal for self.

## 2018-05-07 NOTE — BH NOTES
GROUP THERAPY PROGRESS NOTE    Roby Sung is participating in [place].      Group time: 30 minutes    Personal goal for participation: discuss daily Tx goal(s) - \"maintain a positive attitude and outlook on recovery\" ; discuss guideline compliance, unit issues and community announcements                    Goal orientation: personal    Group therapy participation: active

## 2018-05-07 NOTE — BH NOTES
Patient has been pleasant and cooperative throughout the shift, spending most of the evening watching TV and laughing and socializing with peers. Patient had no visitors this evening. Patient has been medication compliant, ate 100% of meals and snacks and wore non-slip footwear throughout the shift. Patient had no falls this evening.

## 2018-05-07 NOTE — BSMART NOTE
GREGORIO assessment/Intervention:  Chart reviewed and discussed it reports  Alana Dumont was interviewed by this writer today. Pt discussed increased anxiety today. He described anxiety as feeling restless and worried. Reports anxiety was present as a young adult and possibly before that time period. He continues to have vivid dreams. He notes he took gabapentin in the past and did not have an issue with vivid dreams. He continues to experience mild tremors related to withdrawals. He denies SI, HI and AVH. SW made contact with patient as he engaged with others within the milieu. Patient reports he has been struggling with his personal life which caused stress within his life. Patient reports he lost his job and his he has been drinking uncontrollably for the past several months. SW made contact with Bety Lemos LCSW to allow patient to attend chemical dependency group. Patient will benefit from substance abuse group upon discharge. SW will continue to monitor patient during hospitalization. SW will consult with treating psychiatrist to complete disposition.     RYANN Mendoza    [phone]

## 2018-05-07 NOTE — BH NOTES
Kenlenard Carla is not participating in Recreational Therapy. Group time: 0170    Personal goal for participation: fresh air break    Goal orientation: social    Group therapy participation: refuse    Therapeutic interventions reviewed and discussed: Staff encouraged Pt.  To participate in group    Impression of participation: Pt refuse, chose to rest in bed despite staff encouragement

## 2018-05-07 NOTE — PROGRESS NOTES
[address]  Inpatient Progress Note     Date of Service: 05/07/18  Hospital Day: 3     Subjective/Interval History   05/07/18    Treatment Team Notes:  Notes reviewed and/or discussed and report that Ani Chandra demonstrated no interval concerns. CIWA: 7/2      Patient interview: Ani Chandra was interviewed by this writer today. Pt discussed increased anxiety today. He described anxiety as feeling restless and worried. Reports anxiety was present as a young adult and possibly before that time period. He continues to have vivid dreams. He notes he took gabapentin in the past and did not have an issue with vivid dreams. He continues to experience mild tremors related to withdrawals. He denies SI, HI and AVH. Objective     Visit Vitals    /81 (BP 1 Location: Right arm, BP Patient Position: Sitting)    Pulse 83    Temp 96.8 °F (36 °C)    Resp 16    Ht 6' (1.829 m)    Wt 61.2 kg (135 lb)    SpO2 96%    BMI 18.31 kg/m2       Mental Status Examination     Appearance/Hygiene 40 y.o.  CM with good hygiene   Behavior/Social Relatedness Appropriate  Relatedness is good   Musculoskeletal Gait/Station: appropriate  Tone (flaccid, cogwheeling, spastic): not assessed  Psychomotor (hyperkinetic, hypokinetic): appropriate   Involuntary movements (tics, dyskinesias, akathisia, stereotypies): mild tremors   Speech                          Rate, rhythm, volume, fluency and articulation are appropriate   Mood                          anxious   Affect                                                   anxious   Thought Process Linear and goal directed     Vagueness, incoherence, circumstantiality, tangentiality, neologisms, perseveration, flight of ideas, or self-contradictory statements not present on assessment   Thought Content and Perceptual Disturbances Denies delusions, ideas of reference, overvalued ideas, ruminations, obsession, compulsions, and phobias     Denies self-injurious behavior (SIB), suicidal ideation (SI), aggressive behavior or homicidal ideation (HI)     Denies auditory and visual hallucinations   Sensorium and Cognition              AOx4, attention intact, memory intact, language use appropriate, and fund of knowledge age appropriate   Insight              fair   Judgment fair         Assessment/Plan      Psychiatric Diagnoses:        Patient Active Problem List   Diagnosis Code    Alcohol use disorder, severe, dependence ([facility] [address] F10.20    Alcohol withdrawal, uncomplicated ([facility] [address] J16.198    Cannabis use disorder, mild, abuse F12.10    Tobacco use disorder, moderate, dependence F17.200    Depressive disorder F32.9      Medical Diagnoses: Elevated LFTs      Psychosocial and contextual factors:                         1.  Alcohol use disorder                        2.  Recently ended relationship                                   3.  Unemployed      Level of impairment/disability: moderate    Lesly Juan is a 40 y.o. who is currently improving. Pt continues to have some withdrawals including tremors. Pt reports vivid dreams last night. Pt willing to increase gabapentin to 300mg po TID and start sertraline 50mg po total daily dose. Pt denies SI, HI and AVH. 1.  Alcoholism and withdrawal   - increase gabapentin to 300mg po TID   - Continue CIWA and other medications as prescribed. 2.  Depressive disorder   - Start sertraline 50mg po total daily dose. 3.  Reviewed instructions, risks, benefits and side effects of medications  4.   Disposition/Discharge Date: self-care/home, 5-    Danyelle Munoz MD DR. [facility]  Psychiatry

## 2018-05-07 NOTE — PROGRESS NOTES
Problem: Falls - Risk of  Goal: *Absence of Falls  Document Dick Fall Risk and appropriate interventions in the flowsheet. Will remain free from falls daily during his hospitalization. Outcome: Progressing Towards Goal  Fall Risk Interventions:            Medication Interventions: Teach patient to arise slowly      Patient will not have any falls while in hospital.           Problem: Alcohol Withdrawal  Goal: *STG: Seeks staff when symptoms of withdrawal increase  Will be assessed by staff each daily, and seek staff, for increased signs and symptoms of withdrawal during hospital stay. Outcome: Progressing Towards Goal  Patient will continue to seek staff for assistance with withdrawal symptoms while in hospital.  Goal: *Remains safe in hospital  Will monitored on a daily basis for safety during withdrawal process during hospitalization. Outcome: Progressing Towards Goal  Patient will remain safe while in hospital.    Problem: Chemical Dependency (Adult/Pediatric)  Goal: *STG: Will identify negative impact of chemical dependency including the use of tobacco, alcohol, and other substances  Will identify negative impact of chemical dependency on a daily basis during hospital stay. Outcome: Progressing Towards Goal  Patient is aware of negative impact of dependency. Comments: Patient affect appears flat and depressed. He has been very polite and cooperative with staff. He has been actively participating in groups. He has been compliant with his medications. Patient spoke with writer about his stressors that lead to returning to alcohol and possible use of other coping mechanisms. Patient is on NELIDA's. He scored a  6. He received Librium 25 mg PO per indications. Patient also received ibuprofen for back pain. He has not voiced any thoughts to harm himself. He is monitored every 15 minutes for safety.

## 2018-05-08 PROCEDURE — 74011250637 HC RX REV CODE- 250/637: Performed by: PSYCHIATRY & NEUROLOGY

## 2018-05-08 PROCEDURE — 65220000003 HC RM SEMIPRIVATE PSYCH

## 2018-05-08 RX ORDER — SERTRALINE HYDROCHLORIDE 50 MG/1
100 TABLET, FILM COATED ORAL
Status: DISCONTINUED | OUTPATIENT
Start: 2018-05-08 | End: 2018-05-10 | Stop reason: HOSPADM

## 2018-05-08 RX ORDER — GABAPENTIN 400 MG/1
400 CAPSULE ORAL 3 TIMES DAILY
Status: DISCONTINUED | OUTPATIENT
Start: 2018-05-08 | End: 2018-05-09

## 2018-05-08 RX ADMIN — GABAPENTIN 400 MG: 400 CAPSULE ORAL at 20:15

## 2018-05-08 RX ADMIN — Medication 100 MG: at 08:11

## 2018-05-08 RX ADMIN — IBUPROFEN 600 MG: 600 TABLET ORAL at 12:43

## 2018-05-08 RX ADMIN — HYDROXYZINE PAMOATE 50 MG: 50 CAPSULE ORAL at 08:12

## 2018-05-08 RX ADMIN — GABAPENTIN 300 MG: 300 CAPSULE ORAL at 08:11

## 2018-05-08 RX ADMIN — GABAPENTIN 400 MG: 400 CAPSULE ORAL at 14:37

## 2018-05-08 RX ADMIN — SERTRALINE HYDROCHLORIDE 100 MG: 50 TABLET ORAL at 20:15

## 2018-05-08 RX ADMIN — MULTIPLE VITAMINS W/ MINERALS TAB 1 TABLET: TAB at 08:11

## 2018-05-08 RX ADMIN — FOLIC ACID 1 MG: 1 TABLET ORAL at 08:11

## 2018-05-08 NOTE — BSMART NOTE
SW assessment/intervention:  SW made contact with patient as he interacted with others within the milieu. Patient was attentive and expressed no major concerns. Patient is content with disposition proposed and will complete Intensive Outpatient Program with Norah Barlow LCSW. SW will continue to monitor patient as needed. SW will consult with treating psychiatrist to complete disposition.     RYANN Pickett

## 2018-05-08 NOTE — BH NOTES
GROUP THERAPY PROGRESS NOTE    Monique Tucker is participating in Recreational Therapy.      Group time: 35 minutes    Personal goal for participation: Coping with recreational therapy    Goal orientation: social    Group therapy participation: active    Therapeutic interventions reviewed and discussed: Recreational therapy provides coping mechanisms related to stress and intense emotions    Impression of participation: Patient has fully participated

## 2018-05-08 NOTE — BH NOTES
Kati Storey is participating in Leisure Activity Group. Group time: 6516-6453    Personal goal for participation:  Decrease Anxiety    Goal orientation: social    Group therapy participation: active    Therapeutic interventions reviewed and discussed:  Staff encouraged to  choos relaxation activities to decrease anxiety while interacting with peers    Impression of participation:  Pt. chose to , play the game who am I with peers, color sujata patterns or watch a movie.

## 2018-05-08 NOTE — BH NOTES
GROUP THERAPY PROGRESS NOTE    Lucille Hoffman is participating in Stress Management.      Group time: 40 minutes    Personal goal for participation: Recovering from stress    Goal orientation: social    Group therapy participation: active    Therapeutic interventions reviewed and discussed: Coping with stress and when stress builds up , how would a person react    Impression of participation: Patient has fully participated

## 2018-05-08 NOTE — BSMART NOTE
OCCUPATIONAL THERAPY PROGRESS NOTE  Group Time:  0216  Attendance: The patient attended full group. Participation:  The patient participated fully in the activity. Attention:  The patient was able to focus on the activity. Interaction:  The patient frequently interacts with others. Did not ID a specific thing about self to work on changing, just (as he did yesterday) avoiding and saying no to certain people.

## 2018-05-08 NOTE — PROGRESS NOTES
Problem: Alcohol Withdrawal  Goal: *STG: Attends activities and groups  Will attend 2-3 groups on a daily basis during hospital stay. Outcome: Progressing Towards Goal  Pt attending scheduled groups  Goal: *Remains safe in hospital  Will monitored on a daily basis for safety during withdrawal process during hospitalization. Outcome: Progressing Towards Goal  Pt has not engaged in any self injurious behaviors    Comments: Pt interacting well with peers. Pt pleasant and cooperative with staff. Pt reports feeling better today. Pt denies SI. Pt has not reported any symptoms of withdrawal. Rounds maintained Q 15 mins.  Staff will continue to offer a safe and supportive environment

## 2018-05-08 NOTE — BH NOTES
Patient appears well groomed and hygienic. Patient presents with a constricted affect and euthymic mood. Patient has participated in group sessions today. Patient did not report SI/HI and AVH. Patient's CIWA score has been assessed; consistently low. Patient was compliant with medications. Patient has demonstrated appropriate behavior during interactions with staff and peers. Patient has not engaged in any hazardous behavior that may result in a fall or injury.

## 2018-05-08 NOTE — PROGRESS NOTES
NUTRITION    Nutrition Screen      RECOMMENDATIONS / PLAN:     - Continue current nutrition interventions. - Continue RD inpatient monitoring and evaluation. NUTRITION DIAGNOSIS & INTERVENTIONS:     [x] Meals/snacks: general/healthful diet   [x] Medical food supplement therapy: continue    Nutrition Diagnosis:  Unintended weight loss related to poor appetite PTA, inadequate energy intake as evidenced by 30-35 lb, 18-21% weight loss x 6 months PTA. Patient meets criteria for Severe Protein Calorie Malnutrition as evidenced by:   ASPEN Malnutrition Criteria  Acute Illness, Chronic Illness, or Social/Enviornmental: Chronic illness  Energy Intake: <75% est energy req for greater than/equal to 1 month  Weight Loss: Greater than 10% x 6 mos  ASPEN Malnutrition Score - Chronic Illness: 7  Chronic Illness - Malnutrition Diagnosis: Severe malnutrition. ASSESSMENT:     5/8: Good meal intake and appetite. Tolerating diet and consuming supplements. 5/5:  Appetite improved. Agreeable to supplements. Average intake adequate to meet patients estimated nutritional needs:   [x] Yes     [] No      [] Unable to determine at this time    Diet: DIET REGULAR  DIET NUTRITIONAL SUPPLEMENTS All Meals; ENSURE ENLIVE    Food Allergies: NKFA  Current Appetite:   [x] Good     [] Fair   [] Poor     [] Other:  Appetite/meal intake prior to admission:   [] Good     [] Fair     [x] Poor x 3 months PTA, variable fair to poor x last 6 months    [] Other:   Feeding Limitations:  [] Swallowing Difficulty       [] Chewing Difficulty       [] Other   Current Meal Intake: No data found.     Gastrointestinal Issues:  [] Yes   [x] No   Skin Integrity:  WDL    Pertinent Medications:  Reviewed: thiamine, folic acid, MVI with iron   Labs:  Reviewed     Anthropometrics:  Ht Readings from Last 1 Encounters:   05/04/18 6' (1.829 m)       Last 3 Recorded Weights in this Encounter    05/04/18 1417 05/04/18 1930   Weight: 61.2 kg (135 lb) 61.2 kg (135 lb)       Body mass index is 18.31 kg/(m^2). Underweight     Weight History: patient reports 30-35 lb (18-21%) weight loss over the past 6 months PTA     Weight Metrics 5/4/2018   Weight 135 lb   BMI 18.31 kg/m2       Admitting Diagnosis: Alcohol abuse  Past Medical History:   Diagnosis Date    Alcohol use disorder, severe, dependence ([facility] [address] 5/4/2018    Alcohol withdrawal, uncomplicated ([facility] [address] 7/8/4855    Cannabis use disorder, mild, abuse 5/5/2018    Depressive episode 5/5/2018    Tobacco use disorder, moderate, dependence 5/5/2018        Education Needs:        [x] None identified  [] Identified - Not appropriate at this time  []  Identified and addressed - refer to education log  Learning Limitations:   [x] None identified  [] Identified    Cultural, Church & ethnic food preferences identified:  [x] None    [] Yes      ESTIMATED NUTRITION NEEDS:     1414-1361 kcal (MSJx1.2-1.3), 49-61 gm protein (0.8-1 gm/kg), 1 mL/kcal  Based on: 61 kg       [x] Actual BW      [] IBW          MONITORING & EVALUATION:     Nutrition Goal(s):   1. Po intake of meals will meet >75% of patient estimated nutritional needs within the next 7 days.   Outcome:   [x] Met    []  Not Met   [] New/Initial Goal    Monitor:  [x] Food and beverage intake   [x] Diet order   [x] Nutrition-focused physical findings   [] Weight      Previous Recommendations (for follow-up assessments only):     [x]   Implemented       []   Not Implemented (RD to address)   [] No Longer Appropriate   [] No Recommendation Made       Discharge Planning: regular diet   [x]  Participated in care planning, discharge planning, & interdisciplinary rounds as appropriate      Luís Mendez, ABHIJEET  Pager: [phone]

## 2018-05-08 NOTE — BSMART NOTE
ART ACTIVITY PROGRESS NOTE    PATIENT SCHEDULED FOR GROUP AT :  1400    ART ACTIVITY: Paper tetrahedron    ATTENDANCE : patient attended the full session. PARTICIPATION LEVEL : Participates fully in the art process. ATTENTION LEVEL : Able to focus on task. Patient engaged in the activity and completed the requested tetrahedron sides with thoughtful and timely self disclosures. He said this was the first time he has sought treatment for his alcoholism, and, thinks he has been an alcoholic since the age of 12. Patient said he is going to remove from his phone all those names of people who will not support his sobriety. \"I gave them second chances before, now I need to take care of myself\".

## 2018-05-08 NOTE — PROGRESS NOTES
[address]  Inpatient Progress Note     Date of Service: 05/08/18  Hospital Day: 4     Subjective/Interval History   05/08/18    Treatment Team Notes:  Notes reviewed and/or discussed and report that Javier Keys demonstrated no interval concerns. CIWA: 6/2/3      Patient interview: Javier Keys was interviewed by this writer today. Improving and stabilizing. States anxiety is improving and cravings are decreasing. Willing to adjust medication. States sleep is poor. Appetite is much improve. Pt is medication compliant and denies medication side effects including TD, EPS, akathisia and mood derangements. Pt denies SI, HI and AVH. Objective     Visit Vitals    /86 (BP 1 Location: Left arm, BP Patient Position: Sitting)    Pulse 88    Temp 98.9 °F (37.2 °C)    Resp 20    Ht 6' (1.829 m)    Wt 61.2 kg (135 lb)    SpO2 96%    BMI 18.31 kg/m2       Mental Status Examination     Appearance/Hygiene 40 y.o. CM with good hygiene   Behavior/Social Relatedness Appropriate  Relatedness is good   Musculoskeletal Gait/Station: appropriate  Tone (flaccid, cogwheeling, spastic): not assessed  Psychomotor (hyperkinetic, hypokinetic): appropriate   Involuntary movements (tics, dyskinesias, akathisia, stereotypies): mild tremors   Speech                          Rate, rhythm, volume, fluency and articulation are appropriate   Mood                          \"I'm better. \"    Affect                                                   Appears fatigued   Thought Process Linear and goal directed     Vagueness, incoherence, circumstantiality, tangentiality, neologisms, perseveration, flight of ideas, or self-contradictory statements not present on assessment   Thought Content and Perceptual Disturbances Denies delusions, ideas of reference, overvalued ideas, ruminations, obsession, compulsions, and phobias     Denies self-injurious behavior (SIB), suicidal ideation (SI), aggressive behavior or homicidal ideation (HI)     Denies auditory and visual hallucinations   Sensorium and Cognition              AOx4, attention intact, memory intact, language use appropriate, and fund of knowledge age appropriate   Insight              fair   Judgment fair         Assessment/Plan      Psychiatric Diagnoses:        Patient Active Problem List   Diagnosis Code    Alcohol use disorder, severe, dependence ([facility] [address] F10.20    Alcohol withdrawal, uncomplicated ([facility] [address] B71.873    Cannabis use disorder, mild, abuse F12.10    Tobacco use disorder, moderate, dependence F17.200    Depressive disorder F32.9      Medical Diagnoses: Elevated LFTs      Psychosocial and contextual factors:                         1.  Alcohol use disorder                        2.  Recently ended relationship                                   3.  Unemployed      Level of impairment/disability: moderate    Hugo Owens is a 40 y.o. who is currently improving. Pt continues to have some withdrawals including tremors. Pt reports vivid improved. Pt willing to increase gabapentin to 400mg po TID and increase sertraline to 100mg po total daily dose. Pt denies SI, HI and AVH. 1.  Alcoholism and withdrawal   - increase gabapentin to 400mg po TID   - Continue CIWA and other medications as prescribed. 2.  Depressive disorder   - increase sertraline to 100mg po total daily dose. 3.  Reviewed instructions, risks, benefits and side effects of medications  4.   Disposition/Discharge Date: self-care/home, 5-    Pat House MD DR. [facility]  Psychiatry

## 2018-05-08 NOTE — BSMART NOTE
SOCIAL WORK GROUP THERAPY PROGRESS NOTE    Group Time:  11am    Group Topic:  Coping Skills    C D Issues    Group Participation:    Actively participated in group discussion. Very supportive of peers. A lot of self disclosure. Also looked at the typical \"stages of alcohol & drug recovery\" from withdrawal, \"honeymoon\" stage, hitting \"The WALL\", learning new skills, maintaining a \"balanced lifestyle & monitoring for \"relapse\" warning signs. \"Seven Steps\" for taking responsibility for our Happiness was reviewed including commitment to change, self-care, setting limits, goal setting & letting go.

## 2018-05-09 PROCEDURE — 74011250637 HC RX REV CODE- 250/637: Performed by: PSYCHIATRY & NEUROLOGY

## 2018-05-09 PROCEDURE — 65220000003 HC RM SEMIPRIVATE PSYCH

## 2018-05-09 RX ORDER — GABAPENTIN 400 MG/1
400 CAPSULE ORAL 3 TIMES DAILY
Status: DISCONTINUED | OUTPATIENT
Start: 2018-05-09 | End: 2018-05-10 | Stop reason: HOSPADM

## 2018-05-09 RX ORDER — ACAMPROSATE CALCIUM 333 MG/1
333 TABLET, DELAYED RELEASE ORAL 3 TIMES DAILY
Status: DISCONTINUED | OUTPATIENT
Start: 2018-05-09 | End: 2018-05-10 | Stop reason: HOSPADM

## 2018-05-09 RX ADMIN — GABAPENTIN 400 MG: 400 CAPSULE ORAL at 21:32

## 2018-05-09 RX ADMIN — FOLIC ACID 1 MG: 1 TABLET ORAL at 08:08

## 2018-05-09 RX ADMIN — GABAPENTIN 400 MG: 400 CAPSULE ORAL at 14:41

## 2018-05-09 RX ADMIN — ACAMPROSATE CALCIUM ENTERIC-COATED 333 MG: 333 TABLET, DELAYED RELEASE ORAL at 09:36

## 2018-05-09 RX ADMIN — SERTRALINE HYDROCHLORIDE 100 MG: 50 TABLET ORAL at 20:08

## 2018-05-09 RX ADMIN — Medication 100 MG: at 08:08

## 2018-05-09 RX ADMIN — ACAMPROSATE CALCIUM ENTERIC-COATED 333 MG: 333 TABLET, DELAYED RELEASE ORAL at 13:25

## 2018-05-09 RX ADMIN — GABAPENTIN 400 MG: 400 CAPSULE ORAL at 08:08

## 2018-05-09 RX ADMIN — TRAZODONE HYDROCHLORIDE 50 MG: 50 TABLET ORAL at 21:34

## 2018-05-09 RX ADMIN — ACAMPROSATE CALCIUM ENTERIC-COATED 333 MG: 333 TABLET, DELAYED RELEASE ORAL at 20:07

## 2018-05-09 RX ADMIN — HYDROXYZINE PAMOATE 50 MG: 50 CAPSULE ORAL at 08:09

## 2018-05-09 RX ADMIN — MULTIPLE VITAMINS W/ MINERALS TAB 1 TABLET: TAB at 08:08

## 2018-05-09 NOTE — BSMART NOTE
OCCUPATIONAL THERAPY PROGRESS NOTE  Group Time:  0138  Attendance: The patient attended full group. Participation:  The patient participated with moderate elaboration in the activity. Attention:  The patient was able to focus on the activity. Interaction:  The patient frequently interacts with others. Participated as asked. Limited disclosure.

## 2018-05-09 NOTE — BSMART NOTE
SW assessment/Intervention:  Chart reviewed and discussed in team treatment meeting it reports Patient has been mainly in the milieu today interacting appropriately with staff and peers. Patient has been playing games and talking with peers for most of the morning. Patient denies SI/HI and AVH and is starting [place] today with hopeful discharge tomorrow. Patient is pleasant and cooperative and compliant with medications. SW made contact with patient as he interacted within the milieu. Patient is selective with peer interaction. Patient reports he is excited about discharge and is willing to complete IOP program.  SW will continue to monitor patient during hospitalization.     RYANN Watson

## 2018-05-09 NOTE — BH NOTES
GROUP THERAPY PROGRESS NOTE    Darius Pardo is participating in [employer].      Group time: 30 minutes    Personal goal for participation: \"focus on my plan to maintain my sobriety after being released and plans to get treatment\"    Goal orientation: personal    Group therapy participation: active    Therapeutic interventions reviewed and discussed: discuss daily Tx goal(s); discuss guideline compliance, unit issues and community announcements

## 2018-05-09 NOTE — PROGRESS NOTES
[address]  Inpatient Progress Note     Date of Service: 05/09/18  Hospital Day: 5     Subjective/Interval History   05/09/18    Treatment Team Notes:  Notes reviewed and/or discussed and report that Vernon Aguirre demonstrated no interval concerns. He attended groups. CIWA: 0    Patient interview: Vernon Aguirre was interviewed by this writer today. Pt feels he is doing well. He reports morning anxiety after awakening and normally takes a Vistaril which is helpful. The pt notes adequate and appropriate sleep and appetite. He notes overall his mood is good. He denies withdrawals related to alcohol use other than a very mild tremor. Pt is medication compliant and denies medication side effects including TD, EPS, akathisia and mood derangements. Pt denies SI, HI and AVH. Objective     Visit Vitals    /80    Pulse 79    Temp 97 °F (36.1 °C)    Resp 18    Ht 6' (1.829 m)    Wt 61.2 kg (135 lb)    SpO2 96%    BMI 18.31 kg/m2       Mental Status Examination     Appearance/Hygiene 40 y.o. CM with good hygiene   Behavior/Social Relatedness Appropriate  Relatedness is good   Musculoskeletal Gait/Station: appropriate  Tone (flaccid, cogwheeling, spastic): not assessed  Psychomotor (hyperkinetic, hypokinetic): appropriate   Involuntary movements (tics, dyskinesias, akathisia, stereotypies): minimal tremors   Speech                          Rate, rhythm, volume, fluency and articulation are appropriate   Mood                          \"I'm anxious in the mornings. \"    Affect                                                   Euthymic   Thought Process Linear and goal directed     Vagueness, incoherence, circumstantiality, tangentiality, neologisms, perseveration, flight of ideas, or self-contradictory statements not present on assessment   Thought Content and Perceptual Disturbances Denies delusions, ideas of reference, overvalued ideas, ruminations, obsession, compulsions, and phobias     Denies self-injurious behavior (SIB), suicidal ideation (SI), aggressive behavior or homicidal ideation (HI)     Denies auditory and visual hallucinations   Sensorium and Cognition              AOx4, attention intact, memory intact, language use appropriate, and fund of knowledge age appropriate   Insight              fair   Judgment fair         Assessment/Plan      Psychiatric Diagnoses:        Patient Active Problem List   Diagnosis Code    Alcohol use disorder, severe, dependence ([facility] [address] F10.20    Alcohol withdrawal, uncomplicated ([facility] [address] N54.952    Cannabis use disorder, mild, abuse F12.10    Tobacco use disorder, moderate, dependence F17.200    Depressive disorder F32.9      Medical Diagnoses: Elevated LFTs      Psychosocial and contextual factors:                         1.  Alcohol use disorder                        2.  Recently ended relationship                                   3.  Unemployed      Level of impairment/disability: moderate    Ani Chandra is a 40 y.o. who is currently improving. Pt continues to have some withdrawals including a minimal tremors. Pt willing to start acamprosate 333mg po TID for alcoholism. Pt denies SI, HI and AVH. 1.  Alcoholism and withdrawal   - Continue gabapentin 400mg po TID (Will change timing of gabapentin to 0800, 1500 and 2200 to better address concerns of morning anxiety)   - Start acamprosate 333mg po TID   - Continue CIWA and other medications as prescribed. 2.  Depressive disorder   - Continue sertraline 100mg po QHS. 3.  Reviewed instructions, risks, benefits and side effects of medications including Si/Sx of anaphylaxis and allergic reactions.     4.  Disposition/Discharge Date: self-care/home, 3-    Serina Hinds MD [facility]  Psychiatry

## 2018-05-09 NOTE — PROGRESS NOTES
Problem: Falls - Risk of  Goal: *Absence of Falls  Document Dick Fall Risk and appropriate interventions in the flowsheet. Will remain free from falls daily during his hospitalization. Outcome: Progressing Towards Goal  Fall Risk Interventions:  Medication Interventions: Teach patient to arise slowly  Pt remains free of falls. Problem: Chemical Dependency (Adult/Pediatric)  Goal: *STG: Verbalizes abstinence as an achievable goal  Will verbalize abstinence as an achievable goal, and steps needed to maintain long term sobriety, on a daily basis during hospital stay. Outcome: Progressing Towards Goal  Pt discusses abstinence as achievable. Problem: Nutrition Deficit  Goal: *Optimize nutritional status  Po intake of meals will meet >75% of patient estimated nutritional needs within the next 7 days. Outcome: Progressing Towards Goal  Pt is eating all meals. Comments: Patient has been mainly in the milieu today interacting appropriately with staff and peers. Patient has been playing games and talking with peers for most of the morning. Patient denies SI/HI and AVH and is starting Campral today with hopeful discharge tomorrow. Patient is pleasant and cooperative and compliant with medications.

## 2018-05-09 NOTE — BH NOTES
Treatment team met -     Medical Director: _____present   Psychiatrist: __x___present   Charge nurse: _x____present   MSW: _x____present   : _x____present   Nurse Manager: x_____present   Student RNs: _____present   Medical Students: _____present   Art Therapist: _x____present   Clinical Coordinator: __x___present   Internal : _x___present   Occupational Therapist: __x___present   : _______ present  UR  x  present  Crisis Supervisor x  Present    Plan of care discussed and updated as appropriate.

## 2018-05-09 NOTE — BH NOTES
Yamile Paulette is not participating in Recreational Therapy. Group time: 0625    Personal goal for participation: fresh air break    Goal orientation: social    Group therapy participation: refuse    Therapeutic interventions reviewed and discussed: Staff encouraged Pt.  To participate in group    Impression of participation: Pt refuse, chose to rest in bed despite staff encouragement

## 2018-05-09 NOTE — BH NOTES
GROUP THERAPY PROGRESS NOTE    Lucille Hoffman is participating in Recreational Therapy.      Group time: 1 hour    Personal goal for participation: Coping with recreational music therapy    Goal orientation: social    Group therapy participation: active    Therapeutic interventions reviewed and discussed: How recreational music therapy helps contribute to positive changes    Impression of participation: Patient has fully participated

## 2018-05-10 VITALS
SYSTOLIC BLOOD PRESSURE: 119 MMHG | TEMPERATURE: 97.4 F | HEART RATE: 79 BPM | OXYGEN SATURATION: 96 % | DIASTOLIC BLOOD PRESSURE: 79 MMHG | RESPIRATION RATE: 18 BRPM | HEIGHT: 72 IN | WEIGHT: 135 LBS | BODY MASS INDEX: 18.28 KG/M2

## 2018-05-10 PROCEDURE — 74011250637 HC RX REV CODE- 250/637: Performed by: PSYCHIATRY & NEUROLOGY

## 2018-05-10 RX ORDER — LANOLIN ALCOHOL/MO/W.PET/CERES
100 CREAM (GRAM) TOPICAL DAILY
Qty: 30 TAB | Refills: 0 | Status: SHIPPED | OUTPATIENT
Start: 2018-05-10 | End: 2020-10-30

## 2018-05-10 RX ORDER — FOLIC ACID 1 MG/1
1 TABLET ORAL DAILY
Qty: 30 TAB | Refills: 0 | Status: SHIPPED | OUTPATIENT
Start: 2018-05-10 | End: 2020-10-30

## 2018-05-10 RX ORDER — ACAMPROSATE CALCIUM 333 MG/1
333 TABLET, DELAYED RELEASE ORAL 3 TIMES DAILY
Qty: 90 TAB | Refills: 0 | Status: SHIPPED | OUTPATIENT
Start: 2018-05-10 | End: 2020-10-30

## 2018-05-10 RX ORDER — GABAPENTIN 400 MG/1
400 CAPSULE ORAL 3 TIMES DAILY
Qty: 90 CAP | Refills: 0 | Status: SHIPPED | OUTPATIENT
Start: 2018-05-10 | End: 2020-10-30

## 2018-05-10 RX ORDER — SERTRALINE HYDROCHLORIDE 100 MG/1
100 TABLET, FILM COATED ORAL
Qty: 30 TAB | Refills: 0 | Status: SHIPPED | OUTPATIENT
Start: 2018-05-10 | End: 2020-10-30

## 2018-05-10 RX ORDER — HYDROXYZINE PAMOATE 50 MG/1
50 CAPSULE ORAL
Qty: 45 CAP | Refills: 0 | Status: SHIPPED | OUTPATIENT
Start: 2018-05-10 | End: 2018-05-24

## 2018-05-10 RX ORDER — TRAZODONE HYDROCHLORIDE 50 MG/1
50 TABLET ORAL
Qty: 30 TAB | Refills: 0 | Status: SHIPPED | OUTPATIENT
Start: 2018-05-10 | End: 2020-10-30

## 2018-05-10 RX ADMIN — GABAPENTIN 400 MG: 400 CAPSULE ORAL at 08:17

## 2018-05-10 RX ADMIN — ACAMPROSATE CALCIUM ENTERIC-COATED 333 MG: 333 TABLET, DELAYED RELEASE ORAL at 08:18

## 2018-05-10 RX ADMIN — FOLIC ACID 1 MG: 1 TABLET ORAL at 08:17

## 2018-05-10 RX ADMIN — HYDROXYZINE PAMOATE 50 MG: 50 CAPSULE ORAL at 08:19

## 2018-05-10 RX ADMIN — Medication 100 MG: at 08:17

## 2018-05-10 RX ADMIN — MULTIPLE VITAMINS W/ MINERALS TAB 1 TABLET: TAB at 08:18

## 2018-05-10 NOTE — BH NOTES
Patient was discharged off unit with his belongings, discharge paperwork and prescriptions. Patient was getting a ride home from a friend.

## 2018-05-10 NOTE — BH NOTES
GROUP THERAPY PROGRESS NOTE    Yonis Baca is participating in [place].      Group time: 30 minutes    Personal goal for participation: verify insight and reality orientation, discuss guideline compliance, unit issues and community announcements     Goal orientation: personal    Group therapy participation: active

## 2018-05-10 NOTE — BH NOTES
Pt. has been polite and cooperative in the milieu interacting with staff and peers. Pt. denies SI,HI and AVH today. Pt contracts for safety on the unit agree to come to staff if feeling harm to self or others. Pt.denies any new medical/pain complaints. Pt. ate 100% of meals and took scheduled medications. Pt. did not have any visitors. Staff encouraged Pt. to  participate in treatment,  medication and group therapy. Pt agreed. Pt. remain free of falls and provided non skid socks. Staff will continue to monitor Pt. for behavior safety and location.

## 2018-05-10 NOTE — DISCHARGE INSTRUCTIONS
BEHAVIORAL HEALTH NURSING DISCHARGE NOTE      Emergency Numbers    : [facility] Emergency Services: [phone]  Suicide Prevention Line: 0 [phone] (TALK)      The following personal items collected during your admission are returned to you:   Dental Appliance: Dental Appliances: None  Vision: Visual Aid: None  Hearing Aid:    Jewelry: Jewelry: [employer], With patient, Watch (locker 126/4)  Clothing: Clothing: Shirt, Pants, Footwear  Other Valuables: Other Valuables: Cell Phone, Monna [place] (locker 126/4)  Valuables sent to safe: Personal Items Sent to Safe: six credit cards, twenty dollar cash        The discharge information has been reviewed with the patient. The patient verbalized understanding. CareFlash Activation    Thank you for requesting access to CareFlash. Please follow the instructions below to securely access and download your online medical record. CareFlash allows you to send messages to your doctor, view your test results, renew your prescriptions, schedule appointments, and more. How Do I Sign Up? In your internet browser, go to [email or web address]  Click on the First Time User? Click Here link in the Sign In box. You will be redirect to the New Member Sign Up page. Enter your CareFlash Access Code exactly as it appears below. You will not need to use this code after youve completed the sign-up process. If you do not sign up before the expiration date, you must request a new code. CareFlash Access Code: [email or web address]  Expires: 2018  2:15 PM (This is the date your CareFlash access code will )    Enter the last four digits of your Social Security Number (xxxx) and Date of Birth (mm/dd/yyyy) as indicated and click Submit. You will be taken to the next sign-up page. Create a CareFlash ID. This will be your CareFlash login ID and cannot be changed, so think of one that is secure and easy to remember. Create a CareFlash password.  You can change your password at any time. Enter your Password Reset Question and Answer. This can be used at a later time if you forget your password. Enter your e-mail address. You will receive e-mail notification when new information is available in [address] Click Sign Up. You can now view and download portions of your medical record. Click the [employer] link to download a portable copy of your medical information. Additional Information    If you have questions, please visit the Frequently Asked Questions section of the [employer] website at [email or web address] [employer]. [employer] Remember, [employer] is NOT to be used for urgent needs. For medical emergencies, dial 911.     Patient armband removed and shredded

## 2018-05-10 NOTE — DISCHARGE SUMMARY
[facility]  Inpatient Psychiatry   Discharge Summary     Admit date: 5/4/2018    Discharge date and time: 5/10/2018 10:15 AM    Discharge Physician: Benita Jin MD    DISCHARGE DIAGNOSES     Psychiatric Diagnoses:           Patient Active Problem List   Diagnosis Code    Alcohol use disorder, severe, dependence ([facility] [address] F10.20    Alcohol withdrawal, uncomplicated ([facility] [address] M38.886    Cannabis use disorder, mild, abuse F12.10    Tobacco use disorder, moderate, dependence F17.200    Depressive disorder F32.9       Medical Diagnoses: Elevated LFTs       Psychosocial and contextual factors:                         0Bard Dylan use disorder                        6.  Recently ended relationship                                   5. Melonie Aguirre presented to the inpatient unit  for inpatient psychiatric hospitalization stating he wants detox from alcohol and feels he drinks due to depression. He endorses depression in the context of his life, his job and relationship with his significant other. He endorses drinking a fifth of vodka daily since January 2018 and when he does not drink, he develops significant tremors. He was prescribed Celexa 1 year ago by his PCP but the pt self-discontinued medications. Mr. Shelly Jones did well while hospitalized. He did not feel Celexa was helpful, therefore this medication was discontinued. He was started on sertraline which was increased to 100mg po QHS. For alcoholism, the pt was started on the CIWA protocol. He was provided folic acid 1mg po daily, thiamine 100mg po daily and a multivitamin 1mg po daily with good results. He was also started on gabapentin for cravings related to alcoholism. Gabapentin was increased to 400mg po TID. The pt used Vistaril 50mg po Qam to decrease morning anxiety. It was determined the pt was likely waking with morning anxiety because he received gabapentin around 2000.   The timing of gabapentin was changed to 0800, 1500 and 2200 which appeared to decrease morning anxiety. Once the period of alcohol withdrawal ended, the pt was started on Campral 333mg po TID. The pt did not want to participate in inpatient rehabilitation. He felt he could stop using alcohol on his own. The pt was not a behavioral concern while hospitalized. he attended groups, was medication compliant and behaviorally appropriate. Pt denied medication side effects including TD, EPS, akathisia and mood derangements. On 5/10/2018 the pt was deemed psychiatrically stable and discharged to home. The pt denied SI, HI and AVH. DISPOSITION/FOLLOW-UP     Disposition: home    Follow-up Appointments: Follow-up Appointments   Procedures    FOLLOW UP VISIT Appointment in: 3 - 5 Days Pt to see Merton Severin LCSW ON 5/14/18 at 3:45pm at [address] [place] [address] # [phone]. . Med check will be made after initial appointment. Please arrive 3:15pm to register. Pt to see Merton Severin LCSW ON 5/14/18 at 3:45pm at [address] [place] [address] # [phone]. . Med check will be made after initial appointment. Please arrive 3:15pm to register. Standing Status:   Standing     Number of Occurrences:   1     Order Specific Question:   Appointment in     Answer:   3 - 5 Days       MEDICATION CHANGES   Outpatient medications:  No current facility-administered medications on file prior to encounter. No current outpatient prescriptions on file prior to encounter.          Medications discontinued during hospitalization:  Medications Discontinued During This Encounter   Medication Reason    nicotine (NICODERM CQ) 21 mg/24 hr patch 1 Patch     gabapentin (NEURONTIN) capsule 100 mg     gabapentin (NEURONTIN) capsule 200 mg     gabapentin (NEURONTIN) capsule 300 mg     sertraline (ZOLOFT) tablet 50 mg     gabapentin (NEURONTIN) capsule 400 mg          Discharged medication:  Discharge Medication List as of 5/10/2018  9:12 AM      START taking these medications    Details   acamprosate (CAMPRAL) 333 mg tablet Take 1 Tab by mouth three (3) times daily. Indications: alcoholism, Print, Disp-90 Tab, R-0      folic acid (FOLVITE) 1 mg tablet Take 1 Tab by mouth daily. Indications: Folate Deficiency, Print, Disp-30 Tab, R-0      gabapentin (NEURONTIN) 400 mg capsule Take 1 Cap by mouth three (3) times daily. Indications: alcoholism, Print, Disp-90 Cap, R-0      hydrOXYzine pamoate (VISTARIL) 50 mg capsule Take 1 Cap by mouth three (3) times daily as needed for Anxiety for up to 14 days. Indications: anxiety, Print, Disp-45 Cap, R-0      sertraline (ZOLOFT) 100 mg tablet Take 1 Tab by mouth nightly. Indications: ANXIETY WITH DEPRESSION, Print, Disp-30 Tab, R-0      thiamine (B-1) 100 mg tablet Take 1 Tab by mouth daily. Indications: THIAMINE DEFICIENCY, Print, Disp-30 Tab, R-0      traZODone (DESYREL) 50 mg tablet Take 1 Tab by mouth nightly as needed for Sleep. Indications: insomnia associated with depression, Print, Disp-30 Tab, R-0      multivitamin, tx-iron-ca-min (THERA-M W/ IRON) 9 mg iron-400 mcg tab tablet Take 1 Tab by mouth daily. Indications: MINERAL DEFICIENCY PREVENTION, Print, Disp-30 Tab, R-0             Instructions, risks (black box warning), benefits and side effects (EPS, TD, NMS) were discussed in detail prior to discharge. Patient denied any adverse medication side effects prior to discharge.        LABS/IMAGING DURING ADMISSION     Results for orders placed or performed during the hospital encounter of 05/04/18   CBC WITH AUTOMATED DIFF   Result Value Ref Range    WBC 6.3 4.6 - 13.2 K/uL    RBC 4.34 (L) 4.70 - 5.50 M/uL    HGB 14.5 13.0 - 16.0 g/dL    HCT 40.2 36.0 - 48.0 %    MCV 92.6 74.0 - 97.0 FL    MCH 33.4 24.0 - 34.0 PG    MCHC 36.1 31.0 - 37.0 g/dL    RDW 12.3 11.6 - 14.5 %    PLATELET 677 576 - 868 K/uL    MPV 9.3 9.2 - 11.8 FL    NEUTROPHILS 79 (H) 40 - 73 %    LYMPHOCYTES 12 (L) 21 - 52 % MONOCYTES 9 3 - 10 %    EOSINOPHILS 0 0 - 5 %    BASOPHILS 0 0 - 2 %    ABS. NEUTROPHILS 5.0 1.8 - 8.0 K/UL    ABS. LYMPHOCYTES 0.8 (L) 0.9 - 3.6 K/UL    ABS. MONOCYTES 0.5 0.05 - 1.2 K/UL    ABS. EOSINOPHILS 0.0 0.0 - 0.4 K/UL    ABS. BASOPHILS 0.0 0.0 - 0.1 K/UL    DF AUTOMATED     METABOLIC PANEL, COMPREHENSIVE   Result Value Ref Range    Sodium 138 136 - 145 mmol/L    Potassium 3.1 (L) 3.5 - 5.5 mmol/L    Chloride 102 100 - 108 mmol/L    CO2 26 21 - 32 mmol/L    Anion gap 10 3.0 - 18 mmol/L    Glucose 147 (H) 74 - 99 mg/dL    BUN 7 7.0 - 18 MG/DL    Creatinine 0.80 0.6 - 1.3 MG/DL    BUN/Creatinine ratio 9 (L) 12 - 20      GFR est AA >60 >60 ml/min/1.73m2    GFR est non-AA >60 >60 ml/min/1.73m2    Calcium 8.2 (L) 8.5 - 10.1 MG/DL    Bilirubin, total 0.9 0.2 - 1.0 MG/DL    ALT (SGPT) 66 (H) 16 - 61 U/L    AST (SGOT) 62 (H) 15 - 37 U/L    Alk.  phosphatase 97 45 - 117 U/L    Protein, total 7.1 6.4 - 8.2 g/dL    Albumin 4.0 3.4 - 5.0 g/dL    Globulin 3.1 2.0 - 4.0 g/dL    A-G Ratio 1.3 0.8 - 1.7     SALICYLATE   Result Value Ref Range    Salicylate level <9.9 (L) 2.8 - 20.0 MG/DL   DRUG SCREEN, URINE   Result Value Ref Range    BENZODIAZEPINES NEGATIVE  NEG      BARBITURATES NEGATIVE  NEG      THC (TH-CANNABINOL) POSITIVE (A) NEG      OPIATES NEGATIVE  NEG      PCP(PHENCYCLIDINE) NEGATIVE  NEG      COCAINE NEGATIVE  NEG      AMPHETAMINES NEGATIVE  NEG      METHADONE NEGATIVE  NEG      HDSCOM (NOTE)    ACETAMINOPHEN   Result Value Ref Range    Acetaminophen level <2 (L) 10.0 - 30.0 ug/mL   ETHYL ALCOHOL   Result Value Ref Range    ALCOHOL(ETHYL),SERUM 139 (H) 0 - 3 MG/DL   EKG, 12 LEAD, INITIAL   Result Value Ref Range    Ventricular Rate 98 BPM    Atrial Rate 98 BPM    P-R Interval 134 ms    QRS Duration 108 ms    Q-T Interval 344 ms    QTC Calculation (Bezet) 439 ms    Calculated P Axis 71 degrees    Calculated R Axis 70 degrees    Calculated T Axis 64 degrees    Diagnosis       Normal sinus rhythm  Normal ECG  No previous ECGs available  Confirmed by Kassi Cha MD, Herson Mosqueda ([id number]) on 5/5/2018 10:52:11 AM          DISCHARGE MENTAL STATUS EVALUATION     Appearance/Hygiene 40 y.o. CM with good hygiene   Behavior/Social Relatedness Appropriate  Relatedness is good   Musculoskeletal Gait/Station: appropriate  Tone (flaccid, cogwheeling, spastic): not assessed  Psychomotor (hyperkinetic, hypokinetic): appropriate   Involuntary movements (tics, dyskinesias, akathisia, stereotypies): none identified   Speech                          Rate, rhythm, volume, fluency and articulation are appropriate   Mood                          \"I feel good. \"    Affect                                                   Euthymic   Thought Process Linear and goal directed      Vagueness, incoherence, circumstantiality, tangentiality, neologisms, perseveration, flight of ideas, or self-contradictory statements not present on assessment   Thought Content and Perceptual Disturbances Denies delusions, ideas of reference, overvalued ideas, ruminations, obsession, compulsions, and phobias      Denies self-injurious behavior (SIB), suicidal ideation (SI), aggressive behavior or homicidal ideation (HI)      Denies auditory and visual hallucinations   Sensorium and Cognition              AOx4, attention intact, memory intact, language use appropriate, and fund of knowledge age appropriate   Insight              fair   Judgment fair         SUICIDE RISK ASSESSMENT     [] Admission  [x] Discharge     Key Factors:   Current admission precipitated by suicide attempt?   []  Yes     2    [x]  No     1     Suicide Attempt History  [] Past attempts of high lethality    2 []  Past attempts of low lethality    1 [x]  No previous attempts       0   Suicidal Ideation []  Constant suicidal thoughts      2 []  Intermittent or fleeting suicidal  thoughts  1 [x]  Denies current suicidal thoughts    0   Suicide Plan   []  Has plan with actual OR potential access to planned method    2 []  Has plan without access to planned method      1 [x]  No plan            0   Plan Lethality []  Highly lethal plan (Carbon monoxide, gun, hanging, jumping)    2 []  Moderate lethality of plan          1 [x]  Low lethality of plan (biting, head banging, superficial scratching, pillow over face)  0   Safety Plan Agreement  []  Unwilling OR unable to agree due to impaired reality testing   2   []  Patient is ambivalent and/or guarded      1 [x]  Reliably agrees        0   Current Morbid Thoughts (reunion fantasies, preoccupations with death) []  Constantly     2     []  Frequently    1 [x]  Rarely    0   Elopement Risk  []  High risk     2 []  Moderate risk    1 [x]   Low risk    0   Symptoms    []  Hopeless  []  Helpless  []  Anhedonia   []  Guilt/shame  []  Anger/rage  []  Anxiety  []  Insomnia   []  Agitation   []  Impulsivity  []  5-6 symptoms present    2 []  3-4 symptoms present    1  [x]  0-2 symptoms present    0     Scoring Key:  10 or higher = Imminent Risk (consider 1:1)  4 - 9 = Moderate Risk (consider q 15 minute observation)Attended alcohol, tobacco, prescription and other drug psychoeducation group.   0 - 3 = Low Risk (consider q 30 minute observation)    Total Score: 1  ------------------------------------------------------------------------------------------------------------------  PLEASE ADDRESS THE FOLLOWING 5 ISSUES     Physician's Subjective Appraisal of Risk (check one):  []  Patient replies not trustworthy: several non-verbal cues. []  Patient replies questionable: trustworthy: at least 1 non-verbal cue.  []  Patient replies appear trustworthy. Family History of Suicide?    [x]  Yes  []  No    Protective measures (select all that apply):  [x]  Successful past responses to stress  [x]  Spiritual/Restorationist beliefs  [x]  Capacity for reality testing  [x]  Positive therapeutic relationships  [x]  Social supports/connections  [x]  Positive coping skills  [x]  Frustration tolerance/optimism  [] Children or pets in the home  [x]  Sense of responsibility to family  [x]  Agrees to treatment plan and follow up    Others (list):    High Risk Diagnoses (select all that apply):  [x]  Depression/Bipolar Disorder  [x]  Dual Diagnosis  []  Cardiovascular Disease  []  Schizophrenia  []  Chronic Pain  []  Epilepsy   []  Cancer  []  Personality Disorder  []  HIV/AIDS  []  Multiple Sclerosis    Dangerousness Assessment (Suicide, homicide, property destruction. ..)    Risk Factors reviewed and risk assessed to be:  [] low  [x] low-moderate  [] moderate   [] moderate-high  [] high     Protection factors reviewed and risk assessed to be:  [x] low  [] low-moderate  [] moderate   [] moderate-high  [] high     Response to treatment and risk assessed to be:  [x] low  [] low-moderate  [] moderate   [] moderate-high  [] high     Support reviewed and risk assessed to be:  [x] low  [] low-moderate  [] moderate   [] moderate-high  [] high     Acceptance of Discharge and outpatient treatment reviewed and risk assessed to be:    [x] low  [] low-moderate  [] moderate   [] moderate-high  [] high   Overall risk assessed to be:  [x] low  [x] low-moderate  [] moderate   [] moderate-high  [] high     Completion of discharge was greater than 30 minutes. Over 50% of today's discharge was geared towards counseling and coordination of care.         Carlito Reid MD  Psychiatry  [facility]

## 2020-10-30 ENCOUNTER — HOSPITAL ENCOUNTER (INPATIENT)
Age: 40
LOS: 5 days | Discharge: HOME | End: 2020-11-04
Attending: INTERNAL MEDICINE | Admitting: INTERNAL MEDICINE
Payer: COMMERCIAL

## 2020-10-30 ENCOUNTER — HOSPITAL ENCOUNTER (EMERGENCY)
Age: 40
Discharge: OTHER INSTITUTION | End: 2020-10-30
Attending: FAMILY MEDICINE
Payer: COMMERCIAL

## 2020-10-30 VITALS
SYSTOLIC BLOOD PRESSURE: 148 MMHG | WEIGHT: 155 LBS | RESPIRATION RATE: 16 BRPM | DIASTOLIC BLOOD PRESSURE: 88 MMHG | HEIGHT: 72 IN | TEMPERATURE: 98 F | OXYGEN SATURATION: 98 % | HEART RATE: 88 BPM | BODY MASS INDEX: 20.99 KG/M2

## 2020-10-30 DIAGNOSIS — F10.10 ALCOHOL ABUSE: Primary | ICD-10-CM

## 2020-10-30 DIAGNOSIS — F10.930 ALCOHOL WITHDRAWAL SYNDROME WITHOUT COMPLICATION (HCC): ICD-10-CM

## 2020-10-30 PROBLEM — F10.939 ALCOHOL WITHDRAWAL (HCC): Status: ACTIVE | Noted: 2020-10-30

## 2020-10-30 LAB
ALBUMIN SERPL-MCNC: 5 G/DL (ref 3.5–4.7)
ALBUMIN/GLOB SERPL: 1.7 {RATIO}
ALP SERPL-CCNC: 115 U/L (ref 38–126)
ALT SERPL-CCNC: 273 U/L (ref 3–72)
AMPHET UR QL SCN: NEGATIVE
ANION GAP SERPL CALC-SCNC: 12 MMOL/L
APAP SERPL-MCNC: <10 UG/ML (ref 10–30)
AST SERPL W P-5'-P-CCNC: 269 U/L (ref 17–74)
BARBITURATES UR QL SCN: NEGATIVE
BASOPHILS # BLD: 0 K/UL (ref 0–0.1)
BASOPHILS NFR BLD: 1 % (ref 0–2)
BENZODIAZ UR QL: NEGATIVE
BILIRUB SERPL-MCNC: 1.3 MG/DL (ref 0.2–1)
BUN SERPL-MCNC: 7 MG/DL (ref 9–21)
BUN/CREAT SERPL: 12
CA-I BLD-MCNC: 8.6 MG/DL (ref 8.5–10.5)
CANNABINOIDS UR QL SCN: POSITIVE
CHLORIDE SERPL-SCNC: 103 MMOL/L (ref 94–111)
CO2 SERPL-SCNC: 27 MMOL/L (ref 21–33)
COCAINE UR QL SCN: NEGATIVE
COVID-19 RAPID TEST, COVR: NOT DETECTED
CREAT SERPL-MCNC: 0.6 MG/DL (ref 0.8–1.5)
DATE LAST DOSE: ABNORMAL
DATE LAST DOSE: ABNORMAL
EOSINOPHIL # BLD: 0 K/UL (ref 0–0.4)
EOSINOPHIL NFR BLD: 0 % (ref 0–5)
ERYTHROCYTE [DISTWIDTH] IN BLOOD BY AUTOMATED COUNT: 12.8 % (ref 11.6–14.5)
ETHANOL PERCENT, ALCP: 0.41 %
ETHANOL PERCENT, ALCP: 0.45 %
ETHANOL SERPL-MCNC: 410 MG/DL
ETHANOL SERPL-MCNC: 450 MG/DL
GLOBULIN SER CALC-MCNC: 3 G/DL
GLUCOSE SERPL-MCNC: 114 MG/DL (ref 70–110)
HCT VFR BLD AUTO: 45 % (ref 36–48)
HGB BLD-MCNC: 15.9 G/DL (ref 13–16)
IMM GRANULOCYTES # BLD AUTO: 0 K/UL
IMM GRANULOCYTES NFR BLD AUTO: 0 %
LYMPHOCYTES # BLD: 1.3 K/UL (ref 0.9–3.6)
LYMPHOCYTES NFR BLD: 29 % (ref 21–52)
MAGNESIUM SERPL-MCNC: 2 MG/DL (ref 1.7–2.8)
MCH RBC QN AUTO: 34.5 PG (ref 24–34)
MCHC RBC AUTO-ENTMCNC: 35.3 G/DL (ref 31–37)
MCV RBC AUTO: 97.6 FL (ref 74–97)
METHADONE UR QL: NEGATIVE
MONOCYTES # BLD: 0.5 K/UL (ref 0.05–1.2)
MONOCYTES NFR BLD: 11 % (ref 3–10)
NEUTS SEG # BLD: 2.7 K/UL (ref 1.8–8)
NEUTS SEG NFR BLD: 59 % (ref 40–73)
OPIATES UR QL: NEGATIVE
OXYCODONE UR QL SCN: NEGATIVE
PCP UR QL: NEGATIVE
PLATELET # BLD AUTO: 277 K/UL (ref 135–420)
PMV BLD AUTO: 9.3 FL (ref 9.2–11.8)
POTASSIUM SERPL-SCNC: 3.5 MMOL/L (ref 3.2–5.1)
PROPOXYPH UR QL: NEGATIVE
PROT SERPL-MCNC: 8 G/DL (ref 6.1–8.4)
RBC # BLD AUTO: 4.61 M/UL (ref 4.7–5.5)
REPORTED DOSE,DOSE: ABNORMAL UNITS
REPORTED DOSE,DOSE: ABNORMAL UNITS
SALICYLATES SERPL-MCNC: <4 MG/DL (ref 4–30)
SARS-COV-2, COV2: NORMAL
SARS-COV-2, COV2: NORMAL
SODIUM SERPL-SCNC: 142 MMOL/L (ref 135–145)
SPECIMEN SOURCE: NORMAL
TRICYCLICS UR QL: NEGATIVE
WBC # BLD AUTO: 4.6 K/UL (ref 4.6–13.2)

## 2020-10-30 PROCEDURE — 74011250636 HC RX REV CODE- 250/636: Performed by: FAMILY MEDICINE

## 2020-10-30 PROCEDURE — 65270000029 HC RM PRIVATE

## 2020-10-30 PROCEDURE — 85025 COMPLETE CBC W/AUTO DIFF WBC: CPT

## 2020-10-30 PROCEDURE — 82607 VITAMIN B-12: CPT

## 2020-10-30 PROCEDURE — 74011000250 HC RX REV CODE- 250: Performed by: FAMILY MEDICINE

## 2020-10-30 PROCEDURE — 87635 SARS-COV-2 COVID-19 AMP PRB: CPT

## 2020-10-30 PROCEDURE — 80053 COMPREHEN METABOLIC PANEL: CPT

## 2020-10-30 PROCEDURE — 80074 ACUTE HEPATITIS PANEL: CPT

## 2020-10-30 PROCEDURE — 99283 EMERGENCY DEPT VISIT LOW MDM: CPT

## 2020-10-30 PROCEDURE — 65310000001 HC RM PRIVATE DETOX

## 2020-10-30 PROCEDURE — 74011250637 HC RX REV CODE- 250/637: Performed by: INTERNAL MEDICINE

## 2020-10-30 PROCEDURE — 86580 TB INTRADERMAL TEST: CPT | Performed by: INTERNAL MEDICINE

## 2020-10-30 PROCEDURE — 80307 DRUG TEST PRSMV CHEM ANLYZR: CPT

## 2020-10-30 PROCEDURE — 83735 ASSAY OF MAGNESIUM: CPT

## 2020-10-30 PROCEDURE — 93005 ELECTROCARDIOGRAM TRACING: CPT

## 2020-10-30 PROCEDURE — 99222 1ST HOSP IP/OBS MODERATE 55: CPT | Performed by: INTERNAL MEDICINE

## 2020-10-30 PROCEDURE — 74011000302 HC RX REV CODE- 302: Performed by: INTERNAL MEDICINE

## 2020-10-30 PROCEDURE — 86592 SYPHILIS TEST NON-TREP QUAL: CPT

## 2020-10-30 PROCEDURE — 82746 ASSAY OF FOLIC ACID SERUM: CPT

## 2020-10-30 RX ORDER — LORAZEPAM 1 MG/1
1 TABLET ORAL 3 TIMES DAILY
Status: COMPLETED | OUTPATIENT
Start: 2020-11-01 | End: 2020-11-02

## 2020-10-30 RX ORDER — TRAZODONE HYDROCHLORIDE 50 MG/1
100 TABLET ORAL
Status: CANCELLED | OUTPATIENT
Start: 2020-10-30

## 2020-10-30 RX ORDER — ONDANSETRON 4 MG/1
8 TABLET, ORALLY DISINTEGRATING ORAL
Status: CANCELLED | OUTPATIENT
Start: 2020-10-30

## 2020-10-30 RX ORDER — IBUPROFEN 400 MG/1
400 TABLET ORAL
Status: CANCELLED | OUTPATIENT
Start: 2020-10-30

## 2020-10-30 RX ORDER — FOLIC ACID 1 MG/1
1 TABLET ORAL DAILY
Status: CANCELLED | OUTPATIENT
Start: 2020-10-31

## 2020-10-30 RX ORDER — ASPIRIN 325 MG/1
100 TABLET, FILM COATED ORAL DAILY
Status: DISCONTINUED | OUTPATIENT
Start: 2020-10-31 | End: 2020-11-04

## 2020-10-30 RX ORDER — CLONIDINE HYDROCHLORIDE 0.1 MG/1
0.1 TABLET ORAL
Status: CANCELLED | OUTPATIENT
Start: 2020-10-30

## 2020-10-30 RX ORDER — CYCLOBENZAPRINE HCL 10 MG
10 TABLET ORAL
Status: DISCONTINUED | OUTPATIENT
Start: 2020-10-30 | End: 2020-11-04

## 2020-10-30 RX ORDER — IBUPROFEN 400 MG/1
400 TABLET ORAL
Status: DISCONTINUED | OUTPATIENT
Start: 2020-10-30 | End: 2020-11-04

## 2020-10-30 RX ORDER — LORAZEPAM 1 MG/1
2 TABLET ORAL 3 TIMES DAILY
Status: COMPLETED | OUTPATIENT
Start: 2020-10-30 | End: 2020-11-01

## 2020-10-30 RX ORDER — ADHESIVE BANDAGE
30 BANDAGE TOPICAL DAILY PRN
Status: DISCONTINUED | OUTPATIENT
Start: 2020-10-30 | End: 2020-11-04

## 2020-10-30 RX ORDER — LORAZEPAM 1 MG/1
1 TABLET ORAL DAILY
Status: DISCONTINUED | OUTPATIENT
Start: 2020-11-02 | End: 2020-10-30

## 2020-10-30 RX ORDER — LORAZEPAM 0.5 MG/1
1 TABLET ORAL EVERY 12 HOURS
Status: CANCELLED | OUTPATIENT
Start: 2020-11-01 | End: 2020-11-02

## 2020-10-30 RX ORDER — THERA TABS 400 MCG
1 TAB ORAL DAILY
Status: CANCELLED | OUTPATIENT
Start: 2020-10-31

## 2020-10-30 RX ORDER — IBUPROFEN 200 MG
1 TABLET ORAL EVERY 24 HOURS
Status: DISCONTINUED | OUTPATIENT
Start: 2020-10-30 | End: 2020-11-03

## 2020-10-30 RX ORDER — LORAZEPAM 1 MG/1
1 TABLET ORAL EVERY 8 HOURS
Status: DISCONTINUED | OUTPATIENT
Start: 2020-10-31 | End: 2020-10-30

## 2020-10-30 RX ORDER — ADHESIVE BANDAGE
30 BANDAGE TOPICAL DAILY PRN
Status: CANCELLED | OUTPATIENT
Start: 2020-10-30

## 2020-10-30 RX ORDER — PANTOPRAZOLE SODIUM 40 MG/1
40 TABLET, DELAYED RELEASE ORAL
Status: DISCONTINUED | OUTPATIENT
Start: 2020-10-30 | End: 2020-11-04

## 2020-10-30 RX ORDER — LANOLIN ALCOHOL/MO/W.PET/CERES
9 CREAM (GRAM) TOPICAL
Status: DISCONTINUED | OUTPATIENT
Start: 2020-10-30 | End: 2020-11-04

## 2020-10-30 RX ORDER — HYDROXYZINE PAMOATE 25 MG/1
25 CAPSULE ORAL
Status: CANCELLED | OUTPATIENT
Start: 2020-10-30

## 2020-10-30 RX ORDER — SUCRALFATE 1 G/1
1 TABLET ORAL
Status: DISCONTINUED | OUTPATIENT
Start: 2020-10-30 | End: 2020-11-04

## 2020-10-30 RX ORDER — LOPERAMIDE HYDROCHLORIDE 2 MG/1
4 CAPSULE ORAL
Status: CANCELLED | OUTPATIENT
Start: 2020-10-30

## 2020-10-30 RX ORDER — HYDROXYZINE PAMOATE 25 MG/1
25 CAPSULE ORAL
Status: DISCONTINUED | OUTPATIENT
Start: 2020-10-30 | End: 2020-11-04

## 2020-10-30 RX ORDER — FOLIC ACID 1 MG/1
1 TABLET ORAL DAILY
Status: DISCONTINUED | OUTPATIENT
Start: 2020-10-31 | End: 2020-11-04

## 2020-10-30 RX ORDER — LORAZEPAM 1 MG/1
2 TABLET ORAL EVERY 8 HOURS
Status: DISCONTINUED | OUTPATIENT
Start: 2020-10-30 | End: 2020-10-30

## 2020-10-30 RX ORDER — LORAZEPAM 1 MG/1
1 TABLET ORAL 2 TIMES DAILY
Status: COMPLETED | OUTPATIENT
Start: 2020-11-02 | End: 2020-11-03

## 2020-10-30 RX ORDER — LORAZEPAM 0.5 MG/1
1 TABLET ORAL DAILY
Status: CANCELLED | OUTPATIENT
Start: 2020-11-02 | End: 2020-11-03

## 2020-10-30 RX ORDER — THERA TABS 400 MCG
1 TAB ORAL DAILY
Status: DISCONTINUED | OUTPATIENT
Start: 2020-10-31 | End: 2020-11-04

## 2020-10-30 RX ORDER — SUCRALFATE 1 G/1
1 TABLET ORAL
Status: CANCELLED | OUTPATIENT
Start: 2020-10-30

## 2020-10-30 RX ORDER — CLONIDINE HYDROCHLORIDE 0.1 MG/1
0.1 TABLET ORAL
Status: DISCONTINUED | OUTPATIENT
Start: 2020-10-30 | End: 2020-11-04

## 2020-10-30 RX ORDER — DOCUSATE SODIUM 100 MG/1
100 CAPSULE, LIQUID FILLED ORAL
Status: CANCELLED | OUTPATIENT
Start: 2020-10-30

## 2020-10-30 RX ORDER — LANOLIN ALCOHOL/MO/W.PET/CERES
9 CREAM (GRAM) TOPICAL
Status: CANCELLED | OUTPATIENT
Start: 2020-10-30

## 2020-10-30 RX ORDER — LORAZEPAM 0.5 MG/1
1 TABLET ORAL EVERY 8 HOURS
Status: CANCELLED | OUTPATIENT
Start: 2020-10-31 | End: 2020-11-01

## 2020-10-30 RX ORDER — IBUPROFEN 200 MG
1 TABLET ORAL EVERY 24 HOURS
Status: CANCELLED | OUTPATIENT
Start: 2020-10-30

## 2020-10-30 RX ORDER — ONDANSETRON 4 MG/1
8 TABLET, ORALLY DISINTEGRATING ORAL
Status: DISCONTINUED | OUTPATIENT
Start: 2020-10-30 | End: 2020-11-04

## 2020-10-30 RX ORDER — DOCUSATE SODIUM 100 MG/1
100 CAPSULE, LIQUID FILLED ORAL
Status: DISCONTINUED | OUTPATIENT
Start: 2020-10-30 | End: 2020-11-04

## 2020-10-30 RX ORDER — CYCLOBENZAPRINE HCL 10 MG
10 TABLET ORAL
Status: CANCELLED | OUTPATIENT
Start: 2020-10-30

## 2020-10-30 RX ORDER — TRAZODONE HYDROCHLORIDE 50 MG/1
100 TABLET ORAL
Status: DISCONTINUED | OUTPATIENT
Start: 2020-10-30 | End: 2020-11-04

## 2020-10-30 RX ORDER — LORAZEPAM 1 MG/1
1 TABLET ORAL EVERY 12 HOURS
Status: DISCONTINUED | OUTPATIENT
Start: 2020-11-01 | End: 2020-10-30

## 2020-10-30 RX ORDER — PANTOPRAZOLE SODIUM 40 MG/1
40 TABLET, DELAYED RELEASE ORAL
Status: CANCELLED | OUTPATIENT
Start: 2020-10-30

## 2020-10-30 RX ORDER — LORAZEPAM 1 MG/1
1 TABLET ORAL DAILY
Status: COMPLETED | OUTPATIENT
Start: 2020-11-03 | End: 2020-11-03

## 2020-10-30 RX ORDER — LOPERAMIDE HYDROCHLORIDE 2 MG/1
4 CAPSULE ORAL
Status: DISCONTINUED | OUTPATIENT
Start: 2020-10-30 | End: 2020-11-04

## 2020-10-30 RX ORDER — IBUPROFEN 200 MG
1 TABLET ORAL EVERY 24 HOURS
Status: DISCONTINUED | OUTPATIENT
Start: 2020-10-30 | End: 2020-10-30

## 2020-10-30 RX ORDER — LORAZEPAM 0.5 MG/1
2 TABLET ORAL EVERY 8 HOURS
Status: CANCELLED | OUTPATIENT
Start: 2020-10-30 | End: 2020-10-31

## 2020-10-30 RX ADMIN — TRAZODONE HYDROCHLORIDE 100 MG: 50 TABLET ORAL at 21:18

## 2020-10-30 RX ADMIN — LORAZEPAM 2 MG: 1 TABLET ORAL at 21:18

## 2020-10-30 RX ADMIN — TUBERCULIN PURIFIED PROTEIN DERIVATIVE 5 UNITS: 5 INJECTION, SOLUTION INTRADERMAL at 18:08

## 2020-10-30 RX ADMIN — SUCRALFATE 1 G: 1 TABLET ORAL at 21:18

## 2020-10-30 RX ADMIN — FOLIC ACID: 5 INJECTION, SOLUTION INTRAMUSCULAR; INTRAVENOUS; SUBCUTANEOUS at 14:01

## 2020-10-30 NOTE — ED PROVIDER NOTES
EMERGENCY DEPARTMENT HISTORY AND PHYSICAL EXAM      Date: 10/30/2020  Patient Name: Sarah Warren      History of Presenting Illness     Chief Complaint   Patient presents with    Alcohol intoxication       History Provided By: Patient    HPI: Sarah Warren, 44 y.o. male with a past medical history significant depression, anxiety, alcohol abuse presents to the ED with cc of being a functioning alcoholic with depression and anxiety that is requesting to go into detox. Patient states that he is a constant drinker for approximately 4-5 years and that he drinks three-fourths of a 1/2 gallon of 35% Vodka daily. States that he is not feeling as though he is suicidal, but appears to have suicidal ideation. Denies being suicidal and states that he is trying to help himself. There are no other complaints, changes, or physical findings at this time. PCP: Steven Bedoya MD        Past History     Past Medical History:  Past Medical History:   Diagnosis Date    Alcohol use disorder, severe, dependence ([address] 5/4/2018    Alcohol withdrawal, uncomplicated ([facility] [address] 3/8/0757    Cannabis use disorder, mild, abuse 5/5/2018    Depressive episode 5/5/2018    Tobacco use disorder, moderate, dependence 5/5/2018       Past Surgical History:  History reviewed. No pertinent surgical history. Family History:  History reviewed. No pertinent family history. Social History:  Social History     Tobacco Use    Smoking status: Never Smoker    Smokeless tobacco: Never Used   Substance Use Topics    Alcohol use: Yes     Comment: \"I drink constantly. \"    Drug use: Yes     Types: Marijuana     Comment: \"a lot\"       Allergies:  No Known Allergies      Review of Systems     Review of Systems   Constitutional: Negative for chills, diaphoresis and fever. HENT: Negative for congestion, ear pain, rhinorrhea, sore throat and trouble swallowing. Eyes: Negative for photophobia, pain, redness and visual disturbance. Respiratory: Negative for cough, chest tightness, shortness of breath and wheezing. Cardiovascular: Negative for chest pain, palpitations and leg swelling. Gastrointestinal: Negative for abdominal pain, blood in stool, diarrhea, nausea and vomiting. Endocrine: Negative for polydipsia, polyphagia and polyuria. Genitourinary: Negative for dysuria, frequency and urgency. Musculoskeletal: Negative for back pain, joint swelling and neck pain. Skin: Negative for color change, pallor, rash and wound. Allergic/Immunologic: Negative for environmental allergies, food allergies and immunocompromised state. Neurological: Negative for seizures, syncope and headaches. Hematological: Negative for adenopathy. Does not bruise/bleed easily. Psychiatric/Behavioral: Positive for suicidal ideas. Negative for agitation, behavioral problems, confusion, hallucinations and self-injury. The patient is not nervous/anxious. Has suicidal ideation, but does not plan to act on the ideas. All other systems reviewed and are negative. Physical Exam     Physical Exam  Constitutional:       General: He is not in acute distress. Appearance: Normal appearance. He is normal weight. He is not diaphoretic. HENT:      Head: Normocephalic and atraumatic. Right Ear: Tympanic membrane, ear canal and external ear normal.      Left Ear: Tympanic membrane, ear canal and external ear normal.      Nose: Nose normal.      Mouth/Throat:      Mouth: Mucous membranes are moist.      Pharynx: Oropharynx is clear. Eyes:      Extraocular Movements: Extraocular movements intact. Conjunctiva/sclera: Conjunctivae normal.      Pupils: Pupils are equal, round, and reactive to light. Neck:      Musculoskeletal: Normal range of motion and neck supple. No neck rigidity or muscular tenderness. Cardiovascular:      Rate and Rhythm: Normal rate and regular rhythm. Pulses: Normal pulses.       Heart sounds: Normal heart sounds. Pulmonary:      Effort: Pulmonary effort is normal. No respiratory distress. Breath sounds: Normal breath sounds. Chest:      Chest wall: No tenderness. Abdominal:      General: Bowel sounds are normal.      Palpations: Abdomen is soft. There is no mass. Tenderness: There is no abdominal tenderness. Musculoskeletal: Normal range of motion. General: No swelling or tenderness. Skin:     General: Skin is warm. Coloration: Skin is not pale. Findings: No erythema. Neurological:      Mental Status: He is alert and oriented to person, place, and time. Sensory: No sensory deficit. Motor: No weakness. Psychiatric:         Attention and Perception: He is attentive. He perceives auditory hallucinations. He does not perceive visual hallucinations. Mood and Affect: Mood is depressed. Mood is not anxious. Affect is flat. Affect is not blunt or angry. Speech: Speech is slurred. Behavior: Behavior is slowed. Behavior is not agitated, aggressive or hyperactive. Thought Content: Thought content normal. Thought content is not paranoid or delusional. Thought content does not include homicidal or suicidal ideation. Thought content does not include homicidal or suicidal plan. Cognition and Memory: Memory normal.         Judgment: Judgment normal. Judgment is not impulsive or inappropriate. Comments: Patient is in a drunken state.           Lab and Diagnostic Study Results     Labs -     Recent Results (from the past 12 hour(s))   CBC WITH AUTOMATED DIFF    Collection Time: 10/30/20 12:15 PM   Result Value Ref Range    WBC 4.6 4.6 - 13.2 K/uL    RBC 4.61 (L) 4.70 - 5.50 M/uL    HGB 15.9 13.0 - 16.0 g/dL    HCT 45.0 36.0 - 48.0 %    MCV 97.6 (H) 74.0 - 97.0 FL    MCH 34.5 (H) 24.0 - 34.0 PG    MCHC 35.3 31.0 - 37.0 g/dL    RDW 12.8 11.6 - 14.5 %    PLATELET 452 832 - 202 K/uL    MPV 9.3 9.2 - 11.8 FL    NEUTROPHILS 59 40 - 73 % LYMPHOCYTES 29 21 - 52 %    MONOCYTES 11 (H) 3 - 10 %    EOSINOPHILS 0 0 - 5 %    BASOPHILS 1 0 - 2 %    IMMATURE GRANULOCYTES 0 %    ABS. NEUTROPHILS 2.7 1.8 - 8.0 K/UL    ABS. LYMPHOCYTES 1.3 0.9 - 3.6 K/UL    ABS. MONOCYTES 0.5 0.05 - 1.2 K/UL    ABS. EOSINOPHILS 0.0 0.0 - 0.4 K/UL    ABS. BASOPHILS 0.0 0.0 - 0.1 K/UL    ABS. IMM. GRANS. 0.0 K/UL   METABOLIC PANEL, COMPREHENSIVE    Collection Time: 10/30/20 12:15 PM   Result Value Ref Range    Sodium 142 135 - 145 mmol/L    Potassium 3.5 3.2 - 5.1 mmol/L    Chloride 103 94 - 111 mmol/L    CO2 27 21 - 33 mmol/L    Anion gap 12 mmol/L    Glucose 114 (H) 70 - 110 mg/dL    BUN 7 (L) 9 - 21 mg/dL    Creatinine 0.60 (L) 0.8 - 1.50 mg/dL    BUN/Creatinine ratio 12      GFR est AA >60 ml/min/1.73m2    GFR est non-AA >60 ml/min/1.73m2    Calcium 8.6 8.5 - 10.5 mg/dL    Bilirubin, total 1.3 (H) 0.2 - 1.0 mg/dL    AST (SGOT) 269 (H) 17 - 74 U/L    ALT (SGPT) 273 (H) 3 - 72 U/L    Alk.  phosphatase 115 38 - 126 U/L    Protein, total 8.0 6.1 - 8.4 g/dL    Albumin 5.0 (H) 3.5 - 4.7 g/dL    Globulin 3.0 g/dL    A-G Ratio 1.7     ETHYL ALCOHOL    Collection Time: 10/30/20 12:15 PM   Result Value Ref Range    ALCOHOL(ETHYL),SERUM 450 (HH) <4 mg/dL    Ethanol, percent 0.450 %   ACETAMINOPHEN    Collection Time: 10/30/20 12:15 PM   Result Value Ref Range    Acetaminophen level <10 (L) 10 - 30 ug/mL    Reported dose date PER BLD SAMPLE      Reported dose: PER BLD SAMPLE Units   SALICYLATE    Collection Time: 10/30/20 12:15 PM   Result Value Ref Range    Salicylate level <6.3 (L) 4 - 30 mg/dL    Reported dose date PER BLD SAMPLE      Reported dose: PER BLD SAMPLE Units   DRUG SCREEN, URINE    Collection Time: 10/30/20 12:20 PM   Result Value Ref Range    AMPHETAMINES Negative Negative      BARBITURATES Negative Negative      BENZODIAZEPINES Negative Negative      COCAINE Negative Negative      METHADONE Negative Negative      OPIATES Negative Negative      OXYCODONE SCREEN Negative Negative      PCP(PHENCYCLIDINE) Negative Negative      PROPOXYPHENE Negative Negative      THC (TH-CANNABINOL) Positive (A) Negative      TRICYCLICS Negative Negative     ETHYL ALCOHOL    Collection Time: 10/30/20  2:45 PM   Result Value Ref Range    ALCOHOL(ETHYL),SERUM 410 (HH) <4 mg/dL    Ethanol, percent 0.406 (HH) <0.004 %       Radiologic Studies -   [unfilled]  CT Results  (Last 48 hours)    None        CXR Results  (Last 48 hours)    None          Medical Decision Making and ED Course   - I am the first and primary provider for this patient. - I reviewed the vital signs, available nursing notes, past medical history, past surgical history, family history and social history. Initial assessment performed. The patients presenting problems have been discussed, and they are in agreement with the care plan formulated and outlined with them. I have encouraged them to ask questions as they arise throughout their visit. Vital Signs-Reviewed the patient's vital signs. Patient Vitals for the past 12 hrs:   Temp Pulse Resp BP SpO2   10/30/20 1517  88 16 (!) 148/88 98 %   10/30/20 1215 98 °F (36.7 °C) 92 18 (!) 149/90 98 %           Records Reviewed: Nursing Notes      ED Course:              Provider Notes (Medical Decision Making):   MDM     1600 -patient presented to the emergency department to get clearance for the detox unit. Patient drinks heavily every day. He appeared to be intoxicated but was able to answer questions appropriately. His initial alcohol level was 450. I talked to Dr. Yi Lazo who is the medical director for the detox unit. Initially he wanted the level to be less than 300. Because of the time a day and the patient being able to speak in coherent sentences they decided to accept him in the detox unit when an alcohol level of only 407 and other labs were unremarkable except for some elevated liver enzymes. Be managed by Dr. Yi Lazo.       Consultations:       Consultations: [phone]: Spoke with Dr. Britany Garcia. States that the patients alcohol level must be below 300 for detox admittance. Will be re-evaluating patients levels in a few hours. Procedures and Critical Care                 Disposition     Disposition:     Discharged    DISCHARGE PLAN:  1. There are no discharge medications for this patient. 2.   Follow-up Information     Follow up With Specialties Details Why Jonathan Franco MD Family Medicine  As needed [facility] [phone]          3. Return to ED if worse   4. There are no discharge medications for this patient. Diagnosis     Clinical Impression:   1. Alcohol abuse        Attestations:By signing my name below, I, Dilia Ma, attest that this documentation has been prepared under the direction and in presence of Dr Valeri Diaz on 10/30/2020. Electronically signed: James Carnes, 10/30/2020 Josefa Abrams MD    Please note that this dictation was completed with [employer], the computer voice recognition software. Quite often unanticipated grammatical, syntax, homophones, and other interpretive errors are inadvertently transcribed by the computer software. Please disregard these errors. Please excuse any errors that have escaped final proofreading. Thank you.

## 2020-10-30 NOTE — PROGRESS NOTES
Received care of 44 y.o. via wheelchair, alert and oriented x 4. Able to stand and ambulate without assistance. Skin moist and flushed, noted anxiety. Report history of chronic back pain. CIWA score 9.  Dr. Ever Vegar aware of patient's arrival.

## 2020-10-30 NOTE — ROUTINE PROCESS
Verbal shift change report given to Brittany Fuentes RN (oncoming nurse) by Tc Cerrato RN (offgoing nurse). Report included the following information Kardex, Intake/Output, MAR, Recent Results and Med Rec Status.

## 2020-10-30 NOTE — H&P
History and Physical    Patient: Nancy Craig MRN: [id number]  SSN: [id number]    YOB: 1980  Age: 44 y.o. Sex: male      Chief Complaint   Patient presents with    Alcohol intoxication   Alcohol withdrawal    Assessment/Plan:     1. ETOH Withdrawal  - Start ativan taper and monitor ciwa scores  - check A24, folic acid and ekg  - check rapid covid    2. ETOH induced hepatitis  - check hep c and recheck cmp in a couple days    3. Reactive htn  - clonidine prn    4. Chewing tobacco  - will offer a nicotine patch    I certify that this patient will require inpatient care spanning greater than 2 midnights for treatment of his alcohol withdrawals  Subjective:      Nancy Craig is a 44 y.o. male who with a longstanding history of heavy and daily alcohol use. He reports he uses approximately 2/3-3/4 of a gallon of hard liquor per day. He reports he has been drinking every day and that he has been using it to dull his pain. He reports some chronic back pain which has had for a very long time but otherwise has no complaints. He has no headache vision changes chest pain palpitation shortness of breath nausea vomiting or diarrhea. He participated in the detox program previously and actually did well unfortunately he did fall off the wan. He has been drinking like this for at least several months. He does admit to occasionally using marijuana and also chewing tobacco..     Past Medical History:   Diagnosis Date    Alcohol use disorder, severe, dependence ([address] 5/4/2018    Alcohol withdrawal, uncomplicated ([facility] [address] 6/1/0857    Cannabis use disorder, mild, abuse 5/5/2018    Depressive episode 5/5/2018    Tobacco use disorder, moderate, dependence 5/5/2018        FMhx: positive for htn, no DM  Social History     Tobacco Use    Smoking status: Never Smoker    Smokeless tobacco: Never Used   Substance Use Topics    Alcohol use: Yes     Comment: \"I drink constantly. \"    Please correct: smokeless tobacco: he does use this. He is heterosexual and has a gilfriend/wife  He uses marijuana daily  Prior to Admission medications    Not on File        No Known Allergies    Review of Systems:  Gen: no fevers or chills,   HEENT: no sore throat, h/a or vision changes  Cv: no cp, palpitations or chest pressure  Resp: no sob or cough  abd : slight indigestion, no epigastric pain, n/v/d  : no dysuria or changes in urinary freq. Neur: slight dizziness noted but he is intoxicated, no vertigo, tinnitus or weakness in his legs  Skin: no new rashes or lesion  MS: normal gait, normal rom in all extremeties, chronic pain in midback which is crhonic  Psych: +depression, + anxiety    Objective:     Vitals:    10/30/20 1215 10/30/20 1517   BP: (!) 149/90 (!) 148/88   Pulse: 92 88   Resp: 18 16   Temp: 98 °F (36.7 °C)    SpO2: 98% 98%   Weight: 155 lb (70.3 kg)    Height: 6' (1.829 m)         Physical Exam:  General: alert awake well-oriented, no acute distress. HEENT: EOMI, no Icterus, no Pallor,  mucosa moist.  Neck: Neck is supple, No JVD  Lungs: breathsounds normal, no respiratory distress on inspection, no rhonchi, no rales,   CVS: heart sounds normal, regular rate and rhythm, no murmurs, no rubs. GI: soft, nontender, normal BS. Extremeties: no cyanosis, no edema,   Neuro: Alert, awake, oriented x3, No new focal deficits, moving all extremeties well. Skin: normal skin turgor, no skin rashes.      Lab/Data Review:    Recent Results (from the past 12 hour(s))   CBC WITH AUTOMATED DIFF    Collection Time: 10/30/20 12:15 PM   Result Value Ref Range    WBC 4.6 4.6 - 13.2 K/uL    RBC 4.61 (L) 4.70 - 5.50 M/uL    HGB 15.9 13.0 - 16.0 g/dL    HCT 45.0 36.0 - 48.0 %    MCV 97.6 (H) 74.0 - 97.0 FL    MCH 34.5 (H) 24.0 - 34.0 PG    MCHC 35.3 31.0 - 37.0 g/dL    RDW 12.8 11.6 - 14.5 %    PLATELET 485 250 - 099 K/uL    MPV 9.3 9.2 - 11.8 FL    NEUTROPHILS 59 40 - 73 %    LYMPHOCYTES 29 21 - 52 %    MONOCYTES 11 (H) 3 - 10 %    EOSINOPHILS 0 0 - 5 %    BASOPHILS 1 0 - 2 %    IMMATURE GRANULOCYTES 0 %    ABS. NEUTROPHILS 2.7 1.8 - 8.0 K/UL    ABS. LYMPHOCYTES 1.3 0.9 - 3.6 K/UL    ABS. MONOCYTES 0.5 0.05 - 1.2 K/UL    ABS. EOSINOPHILS 0.0 0.0 - 0.4 K/UL    ABS. BASOPHILS 0.0 0.0 - 0.1 K/UL    ABS. IMM. GRANS. 0.0 K/UL   METABOLIC PANEL, COMPREHENSIVE    Collection Time: 10/30/20 12:15 PM   Result Value Ref Range    Sodium 142 135 - 145 mmol/L    Potassium 3.5 3.2 - 5.1 mmol/L    Chloride 103 94 - 111 mmol/L    CO2 27 21 - 33 mmol/L    Anion gap 12 mmol/L    Glucose 114 (H) 70 - 110 mg/dL    BUN 7 (L) 9 - 21 mg/dL    Creatinine 0.60 (L) 0.8 - 1.50 mg/dL    BUN/Creatinine ratio 12      GFR est AA >60 ml/min/1.73m2    GFR est non-AA >60 ml/min/1.73m2    Calcium 8.6 8.5 - 10.5 mg/dL    Bilirubin, total 1.3 (H) 0.2 - 1.0 mg/dL    AST (SGOT) 269 (H) 17 - 74 U/L    ALT (SGPT) 273 (H) 3 - 72 U/L    Alk.  phosphatase 115 38 - 126 U/L    Protein, total 8.0 6.1 - 8.4 g/dL    Albumin 5.0 (H) 3.5 - 4.7 g/dL    Globulin 3.0 g/dL    A-G Ratio 1.7     ETHYL ALCOHOL    Collection Time: 10/30/20 12:15 PM   Result Value Ref Range    ALCOHOL(ETHYL),SERUM 450 (HH) <4 mg/dL    Ethanol, percent 0.450 %   ACETAMINOPHEN    Collection Time: 10/30/20 12:15 PM   Result Value Ref Range    Acetaminophen level <10 (L) 10 - 30 ug/mL    Reported dose date PER BLD SAMPLE      Reported dose: PER BLD SAMPLE Units   SALICYLATE    Collection Time: 10/30/20 12:15 PM   Result Value Ref Range    Salicylate level <7.0 (L) 4 - 30 mg/dL    Reported dose date PER BLD SAMPLE      Reported dose: PER BLD SAMPLE Units   DRUG SCREEN, URINE    Collection Time: 10/30/20 12:20 PM   Result Value Ref Range    AMPHETAMINES Negative Negative      BARBITURATES Negative Negative      BENZODIAZEPINES Negative Negative      COCAINE Negative Negative      METHADONE Negative Negative      OPIATES Negative Negative      OXYCODONE SCREEN Negative Negative      PCP(PHENCYCLIDINE) Negative Negative      PROPOXYPHENE Negative Negative      THC (TH-CANNABINOL) Positive (A) Negative      TRICYCLICS Negative Negative     ETHYL ALCOHOL    Collection Time: 10/30/20  2:45 PM   Result Value Ref Range    ALCOHOL(ETHYL),SERUM 410 (HH) <4 mg/dL    Ethanol, percent 0.406 (HH) <0.004 %           Signed By: Brant Paul MD     October 30, 2020

## 2020-10-31 LAB
ALBUMIN SERPL-MCNC: 4 G/DL (ref 3.5–4.7)
ALBUMIN/GLOB SERPL: 1.6 {RATIO}
ALP SERPL-CCNC: 104 U/L (ref 38–126)
ALT SERPL-CCNC: 173 U/L (ref 3–72)
ANION GAP SERPL CALC-SCNC: 8 MMOL/L
AST SERPL W P-5'-P-CCNC: 115 U/L (ref 17–74)
BILIRUB SERPL-MCNC: 2.2 MG/DL (ref 0.2–1)
BUN SERPL-MCNC: 10 MG/DL (ref 9–21)
BUN/CREAT SERPL: 14
CA-I BLD-MCNC: 8.9 MG/DL (ref 8.5–10.5)
CHLORIDE SERPL-SCNC: 97 MMOL/L (ref 94–111)
CO2 SERPL-SCNC: 27 MMOL/L (ref 21–33)
CREAT SERPL-MCNC: 0.7 MG/DL (ref 0.8–1.5)
GLOBULIN SER CALC-MCNC: 2.5 G/DL
GLUCOSE SERPL-MCNC: 152 MG/DL (ref 70–110)
POTASSIUM SERPL-SCNC: 3.8 MMOL/L (ref 3.2–5.1)
PROT SERPL-MCNC: 6.5 G/DL (ref 6.1–8.4)
SODIUM SERPL-SCNC: 132 MMOL/L (ref 135–145)

## 2020-10-31 PROCEDURE — 65270000029 HC RM PRIVATE

## 2020-10-31 PROCEDURE — 36415 COLL VENOUS BLD VENIPUNCTURE: CPT

## 2020-10-31 PROCEDURE — 74011250637 HC RX REV CODE- 250/637: Performed by: INTERNAL MEDICINE

## 2020-10-31 PROCEDURE — 80053 COMPREHEN METABOLIC PANEL: CPT

## 2020-10-31 PROCEDURE — HZ2ZZZZ DETOXIFICATION SERVICES FOR SUBSTANCE ABUSE TREATMENT: ICD-10-PCS | Performed by: FAMILY MEDICINE

## 2020-10-31 PROCEDURE — 65310000001 HC RM PRIVATE DETOX

## 2020-10-31 PROCEDURE — 99232 SBSQ HOSP IP/OBS MODERATE 35: CPT | Performed by: INTERNAL MEDICINE

## 2020-10-31 RX ORDER — LORAZEPAM 1 MG/1
2 TABLET ORAL
Status: COMPLETED | OUTPATIENT
Start: 2020-10-31 | End: 2020-10-31

## 2020-10-31 RX ADMIN — TRAZODONE HYDROCHLORIDE 100 MG: 50 TABLET ORAL at 21:07

## 2020-10-31 RX ADMIN — SUCRALFATE 1 G: 1 TABLET ORAL at 10:52

## 2020-10-31 RX ADMIN — SUCRALFATE 1 G: 1 TABLET ORAL at 06:51

## 2020-10-31 RX ADMIN — FOLIC ACID 1 MG: 1 TABLET ORAL at 08:47

## 2020-10-31 RX ADMIN — SUCRALFATE 1 G: 1 TABLET ORAL at 21:07

## 2020-10-31 RX ADMIN — SUCRALFATE 1 G: 1 TABLET ORAL at 16:16

## 2020-10-31 RX ADMIN — LORAZEPAM 2 MG: 1 TABLET ORAL at 16:16

## 2020-10-31 RX ADMIN — THERA TABS 1 TABLET: TAB at 08:47

## 2020-10-31 RX ADMIN — Medication 100 MG: at 08:47

## 2020-10-31 RX ADMIN — LORAZEPAM 2 MG: 1 TABLET ORAL at 09:58

## 2020-10-31 RX ADMIN — LORAZEPAM 2 MG: 1 TABLET ORAL at 21:08

## 2020-10-31 RX ADMIN — LORAZEPAM 2 MG: 1 TABLET ORAL at 06:37

## 2020-10-31 RX ADMIN — PANTOPRAZOLE SODIUM 40 MG: 40 TABLET, DELAYED RELEASE ORAL at 06:51

## 2020-10-31 NOTE — GROUP NOTE
[facility] GROUP DOCUMENTATION INDIVIDUAL Group Therapy Note Date: 10/31/2020 Group Start Time: 1927 Group End Time: 4208 Group Topic: Education Group - Inpatient [facility] DETOX [employer] [facility] GROUP DOCUMENTATION GROUP Group Therapy Note Topic:  Life Skills- Resilience Lul Dewitt Attendance: Attended Patient's Goal: Identify the impact of chemical dependency Interventions/techniques: Informed and Supported Follows Directions: Followed directions Interactions: Interacted appropriately Mental Status: Calm and Happy Behavior/appearance: Attentive, Cooperative and Motivated Goals Achieved: Able to engage in interactions and Able to reflect/comment on own behavior Additional Notes: Staff supported Ale Alonzo with discussion on resilience the capacity to recover quickly from difficulties toughness. Staff supported Ale Alonzo with the completion/discussion on resilience activity on resilience. Ale Alonzo verbalized understanding by talking about some things he has been through. Ale Alonzo is progressing towards his treatment goals by developing skills necessary to maintain sobriety and prevent relapse. Bhumi Loaiza Documentation reviewed by Dr. Levora Epley, EdJesseD., LPC, LSATP, CCS, CAADC, CSAC, QMHP-A

## 2020-10-31 NOTE — GROUP NOTE
[facility] GROUP DOCUMENTATION INDIVIDUAL Group Therapy Note Date: 10/31/2020 Group Start Time: [id number] Group End Time: 0698 Group Topic: Education Group - Inpatient [facility] DETOX [place] [facility] GROUP DOCUMENTATION GROUP Group Therapy Note Topic: Stress Attendees: 01 Attendance: Attended Patient's Goal:  Identify the negative impacts of chemical dependency Interventions/techniques: Informed and Supported Follows Directions: Followed directions Interactions: Interacted appropriately Mental Status: Calm and Happy Behavior/appearance: Attentive and Cooperative Goals Achieved: Able to engage in interactions and Able to reflect/comment on own behavior Additional Notes: Cipriano Eisenmenger was supported in individual group session of recognizing what stress is. He was able to verbalize an understanding of stress, and complete the activity sheet which helped identify the negative effects of stress which can be reduced with the use of social support, emotional management skills, maintaining a healthy life balance, and attending the basic needs. Cipriano Eisenmenger is progressing towards his treatment goal of developing a set of skills to assist in understanding the factors which contribute to the development of his addictions. Cipriano Eisenmenger verbalized that he understood the importance of making changes in his life,and verbalize his comprehension to staff. Bhumi Loaiza Documentation reviewed by Dr. Everardo Fregoso Ed.D., LPC, LSATP, CCS, CAADC, CSAC, QMHP-A

## 2020-10-31 NOTE — H&P
Progress Note    Patient: Nancy Qureshi MRN: [id number]  SSN: [id number]    YOB: 1980  Age: 44 y.o. Sex: male      Admit Date: 10/30/2020    LOS: 1 day     Assessment and Plan:     1. ETOH Withdrawal with ciwa of 12  - patient very very shaky this am and tachy  - given hx, will give a one time extra dose of ativan and will continue ativan protocol  - continue comfort meds     2. ETOH induced hepatitis  - check hep c and will recheck cmp on Sunday.     3. Reactive htn  - clonidine prn     4. Chewing tobacco  - continue nicotine patch              Subjective:   Feels ok but very anxious. C/o mild back pain which is chronic. No n/v/d. Epigastric discomfort is resolved. Very very shaky. Patient notes this happens when he stops drinking.  No s/o hallucination either auditory or visual.        Current Facility-Administered Medications   Medication Dose Route Frequency    docusate sodium (COLACE) capsule 100 mg  100 mg Oral DAILY PRN    folic acid (FOLVITE) tablet 1 mg  1 mg Oral DAILY    ibuprofen (MOTRIN) tablet 400 mg  400 mg Oral Q4H PRN    loperamide (IMODIUM) capsule 4 mg  4 mg Oral Q4H PRN    magnesium hydroxide (MILK OF MAGNESIA) 400 mg/5 mL oral suspension 30 mL  30 mL Oral DAILY PRN    therapeutic multivitamin (THERAGRAN) tablet 1 Tab  1 Tab Oral DAILY    cloNIDine HCL (CATAPRES) tablet 0.1 mg  0.1 mg Oral Q4H PRN    cyclobenzaprine (FLEXERIL) tablet 10 mg  10 mg Oral TID PRN    hydrOXYzine pamoate (VISTARIL) capsule 25 mg  25 mg Oral TID PRN    melatonin tablet 9 mg  9 mg Oral QHS PRN    ondansetron (ZOFRAN ODT) tablet 8 mg  8 mg Oral Q8H PRN    pantoprazole (PROTONIX) tablet 40 mg  40 mg Oral ACB    sucralfate (CARAFATE) tablet 1 g  1 g Oral AC&HS    traZODone (DESYREL) tablet 100 mg  100 mg Oral QHS    tuberculin injection 5 Units  5 Units IntraDERMal ONCE    thiamine mononitrate (B-1) tablet 100 mg  100 mg Oral DAILY    LORazepam (ATIVAN) tablet 2 mg  2 mg Oral TID Followed by   Librado Braxton ON 11/1/2020] LORazepam (ATIVAN) tablet 1 mg  1 mg Oral TID    Followed by   Librado Braxton ON 11/2/2020] LORazepam (ATIVAN) tablet 1 mg  1 mg Oral BID    Followed by   Librado Braxton ON 11/3/2020] LORazepam (ATIVAN) tablet 1 mg  1 mg Oral DAILY    nicotine (NICODERM CQ) 21 mg/24 hr patch 1 Patch  1 Patch TransDERmal Q24H        Vitals:    10/30/20 1653 10/30/20 1955 10/31/20 0024 10/31/20 0440   BP: (!) 136/99 133/79 (!) 104/52 139/77   Pulse: 84 (!) 105 (!) 101 100   Resp: 20 18 20 18   Temp: 98.4 °F (36.9 °C) 98.1 °F (36.7 °C) 97.8 °F (36.6 °C) 97.9 °F (36.6 °C)   SpO2: 100% 98% 96% 98%   Weight: 68.5 kg (151 lb)      Height: 6' 1\" (1.854 m)        Objective:   General: alert awake well-oriented, no acute distress. Very tremulous  HEENT: EOMI, no Icterus, no Pallor,  mucosa slightly dry. Neck: Neck is supple, No JVD  Lungs: breathsounds normal, no respiratory distress on inspection, no rhonchi, no rales,   CVS: heart sounds normal,tachy no m/r/g   GI: soft, nontender, normal BS. Extremeties: no cyanosis, no edema,   Neuro: Alert, awake, oriented x3, No new focal deficits, moving all extremeties well. Skin: normal skin turgor, no skin rashes. Intake and Output:  Current Shift: No intake/output data recorded. Last three shifts: No intake/output data recorded. Lab/Data Review:  Recent Results (from the past 24 hour(s))   CBC WITH AUTOMATED DIFF    Collection Time: 10/30/20 12:15 PM   Result Value Ref Range    WBC 4.6 4.6 - 13.2 K/uL    RBC 4.61 (L) 4.70 - 5.50 M/uL    HGB 15.9 13.0 - 16.0 g/dL    HCT 45.0 36.0 - 48.0 %    MCV 97.6 (H) 74.0 - 97.0 FL    MCH 34.5 (H) 24.0 - 34.0 PG    MCHC 35.3 31.0 - 37.0 g/dL    RDW 12.8 11.6 - 14.5 %    PLATELET 636 953 - 597 K/uL    MPV 9.3 9.2 - 11.8 FL    NEUTROPHILS 59 40 - 73 %    LYMPHOCYTES 29 21 - 52 %    MONOCYTES 11 (H) 3 - 10 %    EOSINOPHILS 0 0 - 5 %    BASOPHILS 1 0 - 2 %    IMMATURE GRANULOCYTES 0 %    ABS.  NEUTROPHILS 2.7 1.8 - 8.0 K/UL ABS. LYMPHOCYTES 1.3 0.9 - 3.6 K/UL    ABS. MONOCYTES 0.5 0.05 - 1.2 K/UL    ABS. EOSINOPHILS 0.0 0.0 - 0.4 K/UL    ABS. BASOPHILS 0.0 0.0 - 0.1 K/UL    ABS. IMM. GRANS. 0.0 K/UL   METABOLIC PANEL, COMPREHENSIVE    Collection Time: 10/30/20 12:15 PM   Result Value Ref Range    Sodium 142 135 - 145 mmol/L    Potassium 3.5 3.2 - 5.1 mmol/L    Chloride 103 94 - 111 mmol/L    CO2 27 21 - 33 mmol/L    Anion gap 12 mmol/L    Glucose 114 (H) 70 - 110 mg/dL    BUN 7 (L) 9 - 21 mg/dL    Creatinine 0.60 (L) 0.8 - 1.50 mg/dL    BUN/Creatinine ratio 12      GFR est AA >60 ml/min/1.73m2    GFR est non-AA >60 ml/min/1.73m2    Calcium 8.6 8.5 - 10.5 mg/dL    Bilirubin, total 1.3 (H) 0.2 - 1.0 mg/dL    AST (SGOT) 269 (H) 17 - 74 U/L    ALT (SGPT) 273 (H) 3 - 72 U/L    Alk.  phosphatase 115 38 - 126 U/L    Protein, total 8.0 6.1 - 8.4 g/dL    Albumin 5.0 (H) 3.5 - 4.7 g/dL    Globulin 3.0 g/dL    A-G Ratio 1.7     ETHYL ALCOHOL    Collection Time: 10/30/20 12:15 PM   Result Value Ref Range    ALCOHOL(ETHYL),SERUM 450 (HH) <4 mg/dL    Ethanol, percent 0.450 %   ACETAMINOPHEN    Collection Time: 10/30/20 12:15 PM   Result Value Ref Range    Acetaminophen level <10 (L) 10 - 30 ug/mL    Reported dose date PER BLD SAMPLE      Reported dose: PER BLD SAMPLE Units   SALICYLATE    Collection Time: 10/30/20 12:15 PM   Result Value Ref Range    Salicylate level <1.4 (L) 4 - 30 mg/dL    Reported dose date PER BLD SAMPLE      Reported dose: PER BLD SAMPLE Units   MAGNESIUM    Collection Time: 10/30/20 12:15 PM   Result Value Ref Range    Magnesium 2.0 1.7 - 2.8 mg/dL   DRUG SCREEN, URINE    Collection Time: 10/30/20 12:20 PM   Result Value Ref Range    AMPHETAMINES Negative Negative      BARBITURATES Negative Negative      BENZODIAZEPINES Negative Negative      COCAINE Negative Negative      METHADONE Negative Negative      OPIATES Negative Negative      OXYCODONE SCREEN Negative Negative      PCP(PHENCYCLIDINE) Negative Negative PROPOXYPHENE Negative Negative      THC (TH-CANNABINOL) Positive (A) Negative      TRICYCLICS Negative Negative     ETHYL ALCOHOL    Collection Time: 10/30/20  2:45 PM   Result Value Ref Range    ALCOHOL(ETHYL),SERUM 410 (HH) <4 mg/dL    Ethanol, percent 0.406 (HH) <0.004 %   EKG, 12 LEAD, INITIAL    Collection Time: 10/30/20  4:58 PM   Result Value Ref Range    Ventricular Rate 77 BPM    Atrial Rate 0 BPM    P-R Interval 60 ms    QRS Duration 91 ms    Q-T Interval 331 ms    QTC Calculation (Bezet) 375 ms    Calculated R Axis 59 degrees    Calculated T Axis 43 degrees    Diagnosis       Atrial fibrillation  Low voltage, extremity leads     SARS-COV-2    Collection Time: 10/30/20  6:00 PM   Result Value Ref Range    SARS-CoV-2 Please find results under separate order     SARS-COV-2    Collection Time: 10/30/20  6:00 PM   Result Value Ref Range    SARS-CoV-2 Please find results under separate order     COVID-19 RAPID TEST    Collection Time: 10/30/20  6:00 PM   Result Value Ref Range    Specimen source Nasopharyngeal      COVID-19 rapid test Not Detected Not Detected            Signed By: João Sims MD     October 31, 2020

## 2020-10-31 NOTE — PROGRESS NOTES
Dr. Matilda Alpers in for assessment. Plan is to give 2mg of Ativan po now per doctor's order and to continue with Ativan taper. Agreement in plan.

## 2020-10-31 NOTE — GROUP NOTE
[facility] GROUP DOCUMENTATION INDIVIDUAL Group Therapy Note Date: 10/31/2020 Group Start Time: 36 Group End Time: 4312 Group Topic: AA/NA 
 
SHF 3 DETOX [employer] [facility] GROUP DOCUMENTATION GROUP Group Therapy Note Topic: AA/NA Attendees: 01 Attendance: Attended Patient's Goal: Identify lifestyle changes to support long term sobriety Interventions/techniques: Informed and Supported Follows Directions: Followed directions Interactions: Interacted appropriately Mental Status: Calm and Happy Behavior/appearance: Attentive, Cooperative and Motivated Goals Achieved: Able to engage in interactions and Able to reflect/comment on own behavior Additional Notes:  Nely Ernandez was supported individual discussion on the importance and benefits of verbalizing one \"12 Steps\" of  Alcoholic Anonymous. Staff supported Nely Ernandez in providing him with an activity sheet that includes the steps and how Nely Ernandez will deal with principals in dealing with 12 Steps. He Shared that he has a problem( alcohol) and that he has hope for himself to overcome his addiction. Nely Ernandez was education on benefits of attending NA/AA Meetings and understanding the substance abuse use of disease that could be treated by a system of applying spiritual values of daily living. Bhumi Loaiza Documentation reviewed by Dr. Gracie Fay, Ed.D., LPC, LSATP, CCS, CAADC, CSAC, QMHP-A

## 2020-10-31 NOTE — GROUP NOTE
[facility] GROUP DOCUMENTATION INDIVIDUAL Group Therapy Note Date: 10/31/2020 Group Start Time: 7294 Group End Time: 3633 Group Topic: Education Group - Inpatient [facility]F 3 DETOX [employer] [facility] GROUP DOCUMENTATION GROUP Group Therapy Note Topic:   Group Education - Anger Management Maverick Pat Attendance: Attended Patient's Goal:  Attends group and address issues for relapse prevention Interventions/techniques: Informed, Provide feedback and Supported Follows Directions: Followed directions Interactions: Interacted appropriately Mental Status: Calm and Happy Behavior/appearance: Attentive, Cooperative and Motivated Goals Achieved: Able to engage in interactions and Able to reflect/comment on own behavior Additional Notes: Natalie Nixon was supported with individual session and activity on anger management. He was able to verbalize an understanding that anger is a problem when it affects health and or well- being, and when it is too intense. He understood that in small doses anger is an appropriate, normal, and healthy emotions. Natalie Nixon was able to share instances in when he became angry and how he handle it. Natalie Nixon is progressing towards his treatment goal of developing a set of skill to assist in understanding the factors which contribute to the development of his addictions. Natalie Nixon verbalized that he understood the importance of making changes in his life, and verbalize his comprehension to staff. Bhumi Loaiza Documentation reviewed by Dr. Tommie Paige, EdJesseD., LPC, LSATP, CCS, CAADC, CSAC, QMHP-A

## 2020-10-31 NOTE — BH NOTES
Chemical Dependency/Substance Abuse Therapist/Counselor Documentation    /THERAPIST PROGRESS NOTE    CURRENT STATUS OF PATIENT: Orientation (check one) [x] Person [x] Place [x] Time [x] Purpose    (Patient voiced concerns: N/A )    Attitude/Behavior toward Examiner:   [x] Appropriate [x] Cooperative [] Aggressive [] Argumentative [] Angry [] Apathetic [] Passive  [] Childlike [] Interactive [] Guarded [] Demanding [] Dramatic [] Evasive [] Hostile [] Irritable [] Manipulative   [] Uncooperative [] Difficult to redirect [] Isolative-Withdrawal [] Sad-Tearful [] Suspicious [] Defensive    TREATMENT PLAN PROBLEM BEING ADDRESS:Relapse Prevention    MODALITY:  [x] Individual Therapy  [x] Group Therapy  [] Family Therapy with Client Present  [] Family Therapy without Client Present  [] Multi-Family Group Therapy  [] Spirituality Psychotherapy Group  [] Psycho-Edutherapy Group    RISK OF HARM:    [x] None identified    [] Self injurious behavior  [] Threatening others without a plan   [] Actively Suicidal with plan  [] Threatening others with plan  [] Suicidal Thoughts without plan [] Weapon in the home  [] Means to complete a plan    Comments: N/A    General Appearance: [x] Normal [] Disheveled [] Emaciated [] Obese [] Poor Hygiene    Dress: [x] Appropriate [] Eccentric [] Seductive [] Bizarre    Mood: [x] Euthymic/normal [] Dysphoric/unease [] Anxious [] Agitated [] Dysthymic [] Depressed [] Euphoric [] Pleasant  [] Bright [] Angry [] Manic    Affect: [x] Appropriate [] Inappropriate [] Labile [] Constricted [] Blunted [] Flat [] Lethargic [] Preoccupied    THOUGHT CONTENT:   [x] Remains focused on topic/thought control [] Unable to remain focused on topic [] Blocking [] Disorganized  [] Confused [] Preoccupied [] Flight of ideas [] Tangential [] Delusional thought content [] Persecutory  [] Bizarre [] Grandeur [] Guilt [] Somatic     PERCEPTION: [x] Normal [] Hallucinations [] Auditory [] Visual [] Tactile [] Olfactory/smell [] Gustatory/taste    Briefly summarize the specifics of the identified symptoms and/or behaviors listed and progress towards improvement and/or group interactions:     MET (Motivational Enhancement Therapy): Based on discussions and interactions with the patient the patient falls within the following stage of treatment:   [] Pre-contemplation: Denial of illness with no need to change  [x] Contemplation: Awareness of problem and considering change  [x] Determination: Willing to change and open to possible change  [] Action: Actively involved in recovery/treatment demonstrates a willing to make plans to change  [] Relapse Prevention: Able to verbalize plans to prevent relapse, 7 day plan    Briefly summarize the patient's interactions/discussions indicating the current or change in level of therapeutic treatment: Pt. Interacts with his group sessions and understands the need for him to change in a positive way in order to remain sober.      TREATMENT PROVIDED:  [] Developed/Reviewed Crisis Prevention Plan  [x] Discussed/Reviewed Treatment Goals on Treatment Plan  [] Discussed/Reviewed Discharge Planning   [] Discussed/Reviewed the patient goals of treatment  [] Refused/Unable to participate in discharge planning [] Other:Relapse Prevention    RECOMMENDATIONS: [] Change current therapeutic focus  [] Change treatment goals/objectives on the treatment plan    CONTINUED PLAN OF CARE: Relapse Prevention, Attend NA/AA Meetings    DISCHARGE PLANNING:   [] Has identified a family support system   [] Patient has not identified a family support system  [] Has connected with sober/clean support system  [] Patient has not connected with sober/clean support system  [] Patient uses special equipment at home and equipment is in the home  [] Has identified community support    [] Patient has not been able to identify community support  Barriers to Discharge:  [] Difficulty returning to present living arrangement  [] Will require assistance with placement post discharge  [] There is no local substance abuse disorder programs available to the patient  [x] Needs referral to AA/NA Meetings    Discharge Planning Comments Not ready yet to be discharged    Documentation reviewed by Dr. Jessy Tobias Ed.D., LPC, LSATP, CCS, CAADC, CSAC, QMHP-A

## 2020-10-31 NOTE — ROUTINE PROCESS
Verbal shift change report given to Martin So (oncoming nurse) by Rema Cranker RN (offgoing nurse). Report included the following information Kardex, Intake/Output, MAR and Recent Results.

## 2020-11-01 LAB
ALBUMIN SERPL-MCNC: 4 G/DL (ref 3.5–4.7)
ALBUMIN/GLOB SERPL: 1.5 {RATIO}
ALP SERPL-CCNC: 104 U/L (ref 38–126)
ALT SERPL-CCNC: 112 U/L (ref 3–72)
ANION GAP SERPL CALC-SCNC: 8 MMOL/L
AST SERPL W P-5'-P-CCNC: 54 U/L (ref 17–74)
ATRIAL RATE: 0 BPM
BILIRUB SERPL-MCNC: 1.7 MG/DL (ref 0.2–1)
BUN SERPL-MCNC: 10 MG/DL (ref 9–21)
BUN/CREAT SERPL: 13
CA-I BLD-MCNC: 8.9 MG/DL (ref 8.5–10.5)
CALCULATED R AXIS, ECG10: 59 DEGREES
CALCULATED T AXIS, ECG11: 43 DEGREES
CHLORIDE SERPL-SCNC: 99 MMOL/L (ref 94–111)
CO2 SERPL-SCNC: 27 MMOL/L (ref 21–33)
CREAT SERPL-MCNC: 0.8 MG/DL (ref 0.8–1.5)
DIAGNOSIS, 93000: NORMAL
FOLATE SERPL-MCNC: 3.9 NG/ML (ref 5–21)
GLOBULIN SER CALC-MCNC: 2.6 G/DL
GLUCOSE SERPL-MCNC: 110 MG/DL (ref 70–110)
HAV IGM SER QL: NONREACTIVE
HBV CORE IGM SER QL: NONREACTIVE
HBV SURFACE AG SER QL: <0.1 INDEX
HBV SURFACE AG SER QL: NEGATIVE
HCV AB SER IA-ACNC: 0.04 INDEX
HCV AB SERPL QL IA: NONREACTIVE
HCV COMMENT,HCGAC: NORMAL
P-R INTERVAL, ECG05: 60 MS
POTASSIUM SERPL-SCNC: 3.3 MMOL/L (ref 3.2–5.1)
PROT SERPL-MCNC: 6.6 G/DL (ref 6.1–8.4)
Q-T INTERVAL, ECG07: 331 MS
QRS DURATION, ECG06: 91 MS
QTC CALCULATION (BEZET), ECG08: 375 MS
SODIUM SERPL-SCNC: 134 MMOL/L (ref 135–145)
SP1: NORMAL
SP2: NORMAL
SP3: NORMAL
VENTRICULAR RATE, ECG03: 77 BPM
VIT B12 SERPL-MCNC: 953 PG/ML (ref 193–986)

## 2020-11-01 PROCEDURE — 99232 SBSQ HOSP IP/OBS MODERATE 35: CPT | Performed by: FAMILY MEDICINE

## 2020-11-01 PROCEDURE — 65270000029 HC RM PRIVATE

## 2020-11-01 PROCEDURE — 65310000001 HC RM PRIVATE DETOX

## 2020-11-01 PROCEDURE — 80053 COMPREHEN METABOLIC PANEL: CPT

## 2020-11-01 PROCEDURE — 36415 COLL VENOUS BLD VENIPUNCTURE: CPT

## 2020-11-01 PROCEDURE — 74011250637 HC RX REV CODE- 250/637: Performed by: INTERNAL MEDICINE

## 2020-11-01 RX ADMIN — SUCRALFATE 1 G: 1 TABLET ORAL at 21:02

## 2020-11-01 RX ADMIN — TRAZODONE HYDROCHLORIDE 100 MG: 50 TABLET ORAL at 21:02

## 2020-11-01 RX ADMIN — IBUPROFEN 400 MG: 400 TABLET, FILM COATED ORAL at 21:01

## 2020-11-01 RX ADMIN — FOLIC ACID 1 MG: 1 TABLET ORAL at 08:48

## 2020-11-01 RX ADMIN — Medication 100 MG: at 08:48

## 2020-11-01 RX ADMIN — SUCRALFATE 1 G: 1 TABLET ORAL at 16:03

## 2020-11-01 RX ADMIN — LORAZEPAM 1 MG: 1 TABLET ORAL at 21:03

## 2020-11-01 RX ADMIN — SUCRALFATE 1 G: 1 TABLET ORAL at 06:54

## 2020-11-01 RX ADMIN — LORAZEPAM 2 MG: 1 TABLET ORAL at 08:48

## 2020-11-01 RX ADMIN — THERA TABS 1 TABLET: TAB at 08:48

## 2020-11-01 RX ADMIN — LORAZEPAM 2 MG: 1 TABLET ORAL at 16:03

## 2020-11-01 RX ADMIN — SUCRALFATE 1 G: 1 TABLET ORAL at 12:10

## 2020-11-01 RX ADMIN — PANTOPRAZOLE SODIUM 40 MG: 40 TABLET, DELAYED RELEASE ORAL at 06:54

## 2020-11-01 NOTE — ROUTINE PROCESS
Verbal shift change report given to Ryan Mirza RN (oncoming nurse) by ANIRUDH Bansal RN (offgoing nurse). Report included the following information Kardex, Intake/Output, MAR and Recent Results.

## 2020-11-01 NOTE — BH NOTES
Chemical Dependency/Substance Abuse Therapist/Counselor Documentation /THERAPIST PROGRESS NOTE CURRENT STATUS OF PATIENT: Orientation (check one) [x] Person [x] Place [x] Time [x] Purpose (Patient voiced concerns:Relapse Prevention ) Attitude/Behavior toward Examiner:  
[x] Appropriate [x] Cooperative [] Aggressive [] Argumentative [] Angry [] Apathetic [] Passive 
[] Childlike [] Interactive [] Guarded [] Demanding [] Dramatic [] Evasive [] Hostile [] Irritable [] Manipulative [] Uncooperative [] Difficult to redirect [] Isolative-Withdrawal [] Sad-Tearful [] Suspicious [] Defensive TREATMENT PLAN PROBLEM BEING ADDRESS:Marty is progressing towards his goal to remain clean and sober MODALITY: 
[x] Individual Therapy [x] Group Therapy 
[] Family Therapy with Client Present 
[] Family Therapy without Client Present 
[] Multi-Family Group Therapy 
[] Spirituality Psychotherapy Group 
[] Psycho-Edutherapy Group RISK OF HARM:   
[x] None identified   
[] Self injurious behavior  [] Threatening others without a plan  
[] Actively Suicidal with plan  [] Threatening others with plan 
[] Suicidal Thoughts without plan [] Weapon in the home 
[] Means to complete a plan Comments: Nayan Man appears to be stable and has a positive attitude. General Appearance: [x] Normal [] Disheveled [] Emaciated [] Obese [] Poor Hygiene Dress: [x] Appropriate [] Eccentric [] Seductive [] Bizarre Mood: [x] Euthymic/normal [] Dysphoric/unease [] Anxious [] Agitated [] Dysthymic [] Depressed [] Euphoric [] Pleasant 
[] Bright [] Angry [] Manic Affect: [] Appropriate [] Inappropriate [] Labile [] Constricted [] Blunted [] Flat [] Lethargic [] Preoccupied THOUGHT CONTENT:  
[x] Remains focused on topic/thought control [] Unable to remain focused on topic [] Blocking [] Disorganized 
[] Confused [] Preoccupied [] Flight of ideas [] Tangential [] Delusional thought content [] Persecutory [] Bizarre [] Grandeur [] Guilt [] Somatic PERCEPTION: [x] Normal [] Hallucinations [] Auditory [] Visual [] Tactile [] Olfactory/smell [] Gustatory/taste Briefly summarize the specifics of the identified symptoms and/or behaviors listed and progress towards improvement and/or group interactions: Nely Ernandez is working towards his goals to remain clean and sober. He is working on his stages of change process. The subject has progressed from Pre- contemplation stage to Contemplation Stage. He realizes that he needed to make a change for the better in order to maintain positive lifestyle. MET (Motivational Enhancement Therapy): Based on discussions and interactions with the patient the patient falls within the following stage of treatment:  
[] Pre-contemplation: Denial of illness with no need to change 
[x] Contemplation: Awareness of problem and considering change 
[x] Determination: Willing to change and open to possible change 
[] Action: Actively involved in recovery/treatment demonstrates a willing to make plans to change 
[] Relapse Prevention: Able to verbalize plans to prevent relapse, 7 day plan Briefly summarize the patient's interactions/discussions indicating the current or change in level of therapeutic treatment:  Nely Ernandez has a very strong social support system and is willing to attend NA/ AA Meetings. TREATMENT PROVIDED: 
[] Developed/Reviewed Crisis Prevention Plan  [x] Discussed/Reviewed Treatment Goals on Treatment Plan 
[] Discussed/Reviewed Discharge Planning   [] Discussed/Reviewed the patient goals of treatment 
[] Refused/Unable to participate in discharge planning [] Other: *** RECOMMENDATIONS: [] Change current therapeutic focus  [] Change treatment goals/objectives on the treatment plan CONTINUED PLAN OF CARE:  Attend N/ A Meetings and DISCHARGE PLANNING:  
[] Has identified a family support system   [] Patient has not identified a family support system [] Has connected with sober/clean support system  [] Patient has not connected with sober/clean support system 
[] Patient uses special equipment at home and equipment is in the home 
[] Has identified community support    [] Patient has not been able to identify community support Barriers to Discharge: 
[] Difficulty returning to present living arrangement 
[] Will require assistance with placement post discharge 
[] There is no local substance abuse disorder programs available to the patient 
[] Needs referral to *** Discharge Planning Comments: ***

## 2020-11-01 NOTE — PROGRESS NOTES
Multidisciplinary meeting completed with Dr. Phyllis Irving RN and NOHELIA Koenig. Advised to continue with present Detox taper. Plan is to assist patient with progression towards treatment goals to include Relapse prevention, and stage of change, coping skills and the 12 Step process. All in agreement with plan.

## 2020-11-01 NOTE — GROUP NOTE
[facility] GROUP DOCUMENTATION INDIVIDUAL Group Therapy Note Date: 11/1/2020 Group Start Time: 8967 Group End Time: 0727 Group Topic: Education Group - Inpatient SHF 3 DETOX [employer] [facility] GROUP DOCUMENTATION GROUP Group Therapy Note Topic: Group Education- 7 Day Plan Howie Roberson Attendance: Attended Patient's Goal:  Able to identify relapse triggers Interventions/techniques: Supported Follows Directions: Followed directions Interactions: Interacted appropriately Mental Status: Calm and Happy Behavior/appearance: Attentive, Cooperative and Motivated Goals Achieved: Able to listen to others Additional Notes:Staff supported Ricardo Hernandez in a discussion on 7 Day Plan. People who are addicts have a hard time finding a 7 Day productive plan in order to stay clean and sober. Staff supported Melisa Eid with activities in  identifying his goals for achieving wellness and he stated his specific goals and consider both daily activities. Melisa Eid is progressing towards his treatment goals by developing the skills necessary to maintain sobriety and prevent relapse. Bhumi Loaiza Documentation reviewed by Dr. Alin Ivory Ed.D., LPC, LSATP, CCS, CAADC, CSAC, QMHP-A

## 2020-11-01 NOTE — GROUP NOTE
[facility] GROUP DOCUMENTATION INDIVIDUAL Group Therapy Note Date: 11/1/2020 Group Start Time: 1 Group End Time: 0170 Group Topic: Education Group - Inpatient SHF 3 DETOX [employer] [facility] GROUP DOCUMENTATION GROUP Group Therapy Note Topic: Life Skills- Communication Skills Jacinta Orozco Attendance: Attended Patient's Goal: Able to identify lifestyle changes to support sobriety Interventions/techniques: Informed, Role playing and Supported Follows Directions: Followed directions Interactions: Interacted appropriately Mental Status: Calm and Happy Behavior/appearance: Attentive, Cooperative and Motivated Goals Achieved: Able to engage in interactions and Able to reflect/comment on own behavior Additional Notes: Staff supported Nayan Man in a discussion on [employer]. People with additive personalities often have ineffective communication styles. Three communication styles are passive, aggressive and assertive. Staff supported Nayan Man with activities addressing communication styles. Nayan Man was able to role play and actively share experiences with communication. Nayan Man is progressing towards his treatment goals by developing the skills necessary to maintain sobriety and prevent relapse. Bhumi Loaiza

## 2020-11-01 NOTE — PROGRESS NOTES
Progress Note    Patient: Wyatt Thayer MRN: [id number]  SSN: [id number]    YOB: 1980  Age: 44 y.o. Sex: male      Admit Date: 10/30/2020    LOS: 2 days     Subjective: The patient is a 30-year-old male who is seen for his second day after alcohol detoxification. His liver functions have been a little elevated. His blood sugar was not fasting but was 152 he is feeling better. He has had no falls or injuries. He is eating this morning. He has had no vomiting or diarrhea. No rashes have been noted. He slept relatively well. Objective:     Vitals:    10/31/20 1630 10/31/20 2000 11/01/20 0000 11/01/20 0400   BP: 103/62 132/77 133/86 129/86   Pulse: 81 95 87 83   Resp: 18 18 18 18   Temp: 98.2 °F (36.8 °C) 98.1 °F (36.7 °C) 97.8 °F (36.6 °C) 98.1 °F (36.7 °C)   SpO2: 98% 97% 98% 97%   Weight:       Height:           Intake and Output:  Current Shift: No intake/output data recorded. Last three shifts: 10/30 1901 - 11/01 0700  In: 520 [P.O.:520]  Out: -     Physical Exam:   He is afebrile. Vital signs are stable. CIWA score is 8. The lungs are clear in no significant distress. He is pleasant and cooperative. He is in no significant distress. His gait is normal.  No rashes. No edema    Lab/Data Review:  I reviewed his lab work. Liver function tests were elevated and will need to get hepatitis studies. It is probably significant with alcoholic hepatitis. Sugar was 152 on admission but it was not fasting. Assessment:     1chronic alcohol dependence  2acute alcohol detoxification  3alcoholic hepatitis with elevated liver function testing    Plan:     Check hepatitis studies. He is having a safe withdrawal.  He has never done a 30-day program.  We may want to consider that with him. He will certainly need to see gastroenterology at some point for further evaluation.     Signed By: Brianna Montana MD     November 1, 2020

## 2020-11-01 NOTE — PROGRESS NOTES
Nutrition Note    Pt reports no complaints with lunch and is satisfied    Electronically signed by Saadia Jauregui on 11/1/2020 at 12:15 PM    Contact: ABHIJEET [phone]

## 2020-11-01 NOTE — GROUP NOTE
[facility] GROUP DOCUMENTATION INDIVIDUAL Group Therapy Note Date: 11/1/2020 Group Start Time: 1030 Group End Time: 3532 Group Topic: AA/NA 
 
SHF 3 DETOX Fadia, [employer] [facility] GROUP DOCUMENTATION GROUP Group Therapy Note Topic: AA/NA Mario Aiken Attendance: Attended Patient's Goal: Relapse prevention plan to include after care Interventions/techniques: Informed and Supported Follows Directions: Followed directions Interactions: Interacted appropriately Mental Status: Calm and Happy Behavior/appearance: Attentive and Cooperative Goals Achieved: Able to engage in interactions and Able to reflect/comment on own behavior Additional Notes: Marley Garcia was supported in individual discussion on the importance and benefits of verbalizing 12 Steps\" of Alcoholic Anonymous and he recited Step 2 of the \"12 Steps\". Staff supported Marley Garcia in providing him with a activity sheet that includes the steps and how Marley Garcia will deal with principals in dealing with 12 Steps. He shared that he has a higher power when it comes to his addiction. Marley Garcia was educated on benefits of attending AA/NA meetings and understanding the substance abuse use of disease that could be treated by a system of applying spiritual values of daily living. Bhumi Loaiza Documentation reviewed by Dr. Emeli Watson, Ed.KUMAR., LPC, LSATP, CCS, CAADC, CSAC, QMHP-A

## 2020-11-01 NOTE — GROUP NOTE
[facility]  GROUP DOCUMENTATION INDIVIDUAL Group Therapy Note Date: 11/1/2020 Group Start Time: 0830 Group End Time: 0900 Group Topic: [employer] SHF 3 DETOX Fadia, [employer] [facility] GROUP DOCUMENTATION GROUP Group Therapy Note Topic: Community Durenda Orn Attendance: Attended Patient's Goal:  Attend groups and activities Interventions/techniques: Informed and Supported Follows Directions: Followed directions Interactions: Interacted appropriately Mental Status: Calm and Happy Behavior/appearance: Cooperative Goals Achieved: Able to listen to others Staff supported patient with an overview of today's schedule, group rules, hygiene, smoke breaks, medication time, tidiness of the room and concerns. Patient did not report any concerns or issues. Attendees: [address] [place] [address] Documentation reviewed by Dr. Deion Murcia, Ed.KUMAR., LPC, LSATP, CCS, CAADC, CSAC, QMHP-A

## 2020-11-01 NOTE — GROUP NOTE
[facility] GROUP DOCUMENTATION INDIVIDUAL Group Therapy Note Date: 11/1/2020 Group Start Time: 9555 Group End Time: 7337 Group Topic: Education Group - Inpatient SHF 3 DETOX Fadia, [employer] [facility] GROUP DOCUMENTATION GROUP Group Therapy Note Topic: Stages of Change-  Tempted To Use [place] [employer] Attendance: Attended Patient's Goal: Able to identify relapse triggers Interventions/techniques: Supported Follows Directions: Followed directions Interactions: Interacted appropriately Mental Status: Calm and Happy Behavior/appearance: Attentive, Cooperative and Motivated Goals Achieved: Able to engage in interactions, Able to give feedback to another and Able to reflect/comment on own behavior Additional Notes: Staff supported Nely Ernandez in an individual session on\" When I Am Tempted To Use\" Staff supported Nely Ernandez with activities in identifying his triggers when he wants to use. He shared some of his life experiences of when he was tempted to use illegal substances. Nely Ernandez is progressing towards his treatment goals by developing the skills necessary to maintain sobriety and prevent relapse. Recovery is a process of change through which people improve their health and wellness, live  self- directed lives, and strive to reach their full potential. 
 
Addicts have severe and chronic substance abuse use disorders can with help, overcome their illness and regain health and social function. Bhumi Loaiza Documentation reviewed by Dr. Gracie Fay Ed.D., LPC, LSATP, CCS, CAADC, CSAC, QMHP-A

## 2020-11-02 LAB — RPR SER QL: NONREACTIVE

## 2020-11-02 PROCEDURE — 65270000029 HC RM PRIVATE

## 2020-11-02 PROCEDURE — 99231 SBSQ HOSP IP/OBS SF/LOW 25: CPT | Performed by: FAMILY MEDICINE

## 2020-11-02 PROCEDURE — 65310000001 HC RM PRIVATE DETOX

## 2020-11-02 PROCEDURE — 74011250637 HC RX REV CODE- 250/637: Performed by: INTERNAL MEDICINE

## 2020-11-02 RX ADMIN — LORAZEPAM 1 MG: 1 TABLET ORAL at 15:58

## 2020-11-02 RX ADMIN — LORAZEPAM 1 MG: 1 TABLET ORAL at 21:39

## 2020-11-02 RX ADMIN — SUCRALFATE 1 G: 1 TABLET ORAL at 12:24

## 2020-11-02 RX ADMIN — SUCRALFATE 1 G: 1 TABLET ORAL at 15:58

## 2020-11-02 RX ADMIN — THERA TABS 1 TABLET: TAB at 09:09

## 2020-11-02 RX ADMIN — SUCRALFATE 1 G: 1 TABLET ORAL at 06:37

## 2020-11-02 RX ADMIN — LORAZEPAM 1 MG: 1 TABLET ORAL at 09:10

## 2020-11-02 RX ADMIN — TRAZODONE HYDROCHLORIDE 100 MG: 50 TABLET ORAL at 21:39

## 2020-11-02 RX ADMIN — Medication 100 MG: at 09:09

## 2020-11-02 RX ADMIN — PANTOPRAZOLE SODIUM 40 MG: 40 TABLET, DELAYED RELEASE ORAL at 06:37

## 2020-11-02 RX ADMIN — SUCRALFATE 1 G: 1 TABLET ORAL at 21:39

## 2020-11-02 RX ADMIN — FOLIC ACID 1 MG: 1 TABLET ORAL at 09:10

## 2020-11-02 NOTE — PROGRESS NOTES
Progress Note    Patient: Lauren Narvaez MRN: [id number]  SSN: [id number]    YOB: 1980  Age: 44 y.o. Sex: male      Admit Date: 10/30/2020    LOS: 3 days     Subjective: The patient is seen today for evaluation. Nausea vomiting or diarrhea. He has had a little sweating as he was eating better yesterday has had little anxiety and stress we shakiness in the morning we do not have a day of discharge from his insurance company at this point he tells me he does not want a 30-day program that he has a significant support system around him. Objective:     Vitals:    11/01/20 1230 11/01/20 1630 11/01/20 2000 11/02/20 0006   BP: 124/83 133/86 (!) 145/95 102/70   Pulse: 85 86 94 74   Resp: 18 18 18 20   Temp: 98.8 °F (37.1 °C) 98.4 °F (36.9 °C) 98 °F (36.7 °C) 97.9 °F (36.6 °C)   SpO2: 98% 98% 99% 98%   Weight:       Height:            Intake and Output:  Current Shift: No intake/output data recorded. Last three shifts: 10/31 1901 - 11/02 0700  In: 360 [P.O.:360]  Out: -     Physical Exam:   Afebrile. His vital signs are stable. The lungs are clear. The abdomen is soft the extremities are clear no edema. He is pleasant and cooperative in no significant distress. No rashes are noted. He has no edema. His gait is normal he is mentally quite clear. No jaundice is appreciated. Lab/Data Review:  His lab work was reevaluated his blood sugar 150 on admission was now 110. His bilirubin was 2.2 and now 1.7 his liver function tests have both decreased tests have both decreased. Assessment:   1chronic alcohol dependence  2. Acute alcohol detoxification  3.Elevated liver function tests with alcoholic hepatitis #[id number] resolving    Plan:     Patient does not want a 30-day program.  We are waiting to see how many days his insurance will give him at this point they have not responded.   We will follow-up with Ativan doses we have ordered hepatitis antibody screening to make sure that is unremarkable.     Signed By: Isha Harrison MD     November 2, 2020

## 2020-11-02 NOTE — PROGRESS NOTES
Daily assessment completed with Dr. Sun Wadsworth. Will continue with current treatment plan. Pt is in agreement.

## 2020-11-02 NOTE — GROUP NOTE
[facility] GROUP DOCUMENTATION INDIVIDUAL Group Therapy Note Date: 11/2/2020 Group Start Time: 6664 Group End Time: 3919 Group Topic: Education Group - Inpatient SHF 3 DETOX Valeria Cruz [facility] GROUP DOCUMENTATION GROUP Group Therapy Note Group Topic: Hepatitis C, HIV & AIDS; STD's Attendees: 2 Attendance: Attended Patient's Goal:  Will identify negative impact of chemical dependency including the use of tobacco, alcohol, and other substances Interventions/techniques: Informed and Supported Follows Directions: Followed directions Interactions: Interacted appropriately Mental Status: Calm Behavior/appearance: attentive and supportive Goals Achieved: Able to engage in interactions, Able to listen to others, Able to self-disclose, Identified feelings and Identified resources and support systems Additional Notes: Андрей Lee  was supported in gaining an understanding that people who engage in drug use or high-risk behaviors associated with drug use put themselves at risk for katarzyna or transmitting viral infections such as human immunodeficiency virus (HIV), acquired immune deficiency syndrome (AIDS), or hepatitis C. He was able to verbalize an understanding of how they are spread, the symptoms and prevention. Resource material was provided. David Mejía

## 2020-11-02 NOTE — GROUP NOTE
[facility]  GROUP DOCUMENTATION INDIVIDUAL Group Therapy Note Date: 11/2/2020 Group Start Time: 0830 Group End Time: 0900 Group Topic: [employer] SHF 3 DETOX [address] [facility] GROUP DOCUMENTATION GROUP Group Therapy Note Topic: Community & Spirituality Attendees: 2 Attendance: Attended Patient's Goal:  Attends activities and groups Interventions/techniques: Informed and Supported Follows Directions: Followed directions Interactions: Interacted appropriately Mental Status: Calm Behavior/appearance: Attentive and Cooperative Goals Achieved: Able to engage in interactions, Able to listen to others, Able to receive feedback, Discussed coping and Identified resources and support systems Additional Notes:  Staff supported patient with an overview of today's schedule, group rules, hygiene, smoke breaks, medication time, tidiness of the room and concerns. Patient did not report any concerns or issues. Latrell Blum

## 2020-11-02 NOTE — ROUTINE PROCESS
Verbal shift change report given to Bhavana Juan RN (oncoming nurse) by ANIRUDH Jean RN (offgoing nurse). Report included the following information Kardex, Intake/Output, MAR and Recent Results.

## 2020-11-02 NOTE — GROUP NOTE
[facility] GROUP DOCUMENTATION INDIVIDUAL Group Therapy Note Date: 11/2/2020 Group Start Time: 1000 Group End Time: 4188 Group Topic: Education Group - Inpatient SHF 3 DETOX Ana Maria Alston [facility] GROUP DOCUMENTATION GROUP Group Therapy Note Attendees: 2 Attendance: Attended Patient's Goal:  Able to indentify relapse triggers including interpersonal/social and familial factors; Verbalizes abstinence as an achievable goal 
 
Interventions/techniques: Informed and Supported Follows Directions: Followed directions Interactions: Interacted appropriately Mental Status: Calm Behavior/appearance: Attentive Goals Achieved: Able to engage in interactions, Able to listen to others, Able to reflect/comment on own behavior, Identified triggers, Identified relapse prevention strategies and Identified resources and support systems Additional Notes:  Magi Bolaños was supported in gaining an understanding that \"Addiction is a treatable, chronic medical disease involving complex interactions among brain circuits, genetics, the environment, and an individuals life experiences. People with addiction use substances or engage in behaviors that become compulsive and often continue despite harmful consequences. Prevention efforts and treatment approaches for addiction are generally as successful as those for other chronic diseases (ASAM, September 15, 2019). Magi Bolaños was able to verbalize an understanding of the disease and share his struggle with alcoholism. Leanna Adler

## 2020-11-02 NOTE — GROUP NOTE
[facility] GROUP DOCUMENTATION INDIVIDUAL Group Therapy Note Date: 11/2/2020 Group Start Time: 2243 Group End Time: 2091 Group Topic: Education Group - Inpatient Topic:  Grounding Techniques SHF 3 DETOX Vesta Umana [facility] GROUP DOCUMENTATION GROUP Group Therapy Note Attendees: 2 Attendance: Attended Patient's Goal: Attends activities and groups, Will identify negative impact of chemical dependency including the use of tobacco, alcohol, and other substances, Verbalizes abstinence as an achievable goal 
Identify lifestyle changes to support long term sobriety such as vocation, employment, education, and legal issues Interventions/techniques: Challenged, Informed, Provide feedback and Supported Follows Directions: Followed directions Interactions: Interacted appropriately Mental Status: Calm Behavior/appearance: Attentive, Cooperative, Motivated and Neatly groomed Goals Achieved: Able to engage in interactions, Able to listen to others, Able to give feedback to another, Able to reflect/comment on own behavior, Able to manage/cope with feelings, Able to receive feedback, Able to self-disclose and Discussed coping Additional Notes:   
Antonia Mojica was supported in learning and developing grounding skills to help stop triggers of thoughts of using and prevent relapse. Antonia Mojica  expressed understanding the importance of developing grounding skills to be able to control triggers and remain in sobriety. He was expressd having an understanding of how to use the grounding techniques to refocus his thoughts away from triggers and cravings. He is working toward completing his treatment goals. Higinio Gage

## 2020-11-02 NOTE — GROUP NOTE
[facility] GROUP DOCUMENTATION INDIVIDUAL Group Therapy Note Date: 11/2/2020 Group Start Time: 2691 Group End Time: 5899 Group Topic: AA/NA 
 
SHF 3 DETOX Halle Cisse [facility] GROUP DOCUMENTATION GROUP Group Therapy Note Topic:  12 Steps Program 
 
Attendees: 2 Attendance: Attended Patient's Goal: Attends activities and groups, Will identify negative impact of chemical dependency including the use of tobacco, alcohol, and other substances,Verbalizes abstinence as an achievable. Interventions/techniques: Informed, Provide feedback and Reinforced Follows Directions: Followed directions Interactions: Interacted appropriately Mental Status: Calm Behavior/appearance: Attentive and Cooperative Goals Achieved: Able to engage in interactions, Able to listen to others, Able to give feedback to another, Able to reflect/comment on own behavior, Able to receive feedback and Able to self-disclose Additional Notes: Luis Barajas was supported in reviewing and understanding the 12 Steps Program.  He shared that he has attended AA but has never worked the steps. He is working toward the completion of his treatment goals. Saravanan Ramsey

## 2020-11-03 LAB — SARS-COV-2, COV2NT: NOT DETECTED

## 2020-11-03 PROCEDURE — 99231 SBSQ HOSP IP/OBS SF/LOW 25: CPT | Performed by: FAMILY MEDICINE

## 2020-11-03 PROCEDURE — 65270000029 HC RM PRIVATE

## 2020-11-03 PROCEDURE — 65310000001 HC RM PRIVATE DETOX

## 2020-11-03 PROCEDURE — 74011250637 HC RX REV CODE- 250/637: Performed by: INTERNAL MEDICINE

## 2020-11-03 RX ADMIN — IBUPROFEN 400 MG: 400 TABLET, FILM COATED ORAL at 11:23

## 2020-11-03 RX ADMIN — Medication 100 MG: at 08:03

## 2020-11-03 RX ADMIN — LORAZEPAM 1 MG: 1 TABLET ORAL at 08:04

## 2020-11-03 RX ADMIN — PANTOPRAZOLE SODIUM 40 MG: 40 TABLET, DELAYED RELEASE ORAL at 06:48

## 2020-11-03 RX ADMIN — SUCRALFATE 1 G: 1 TABLET ORAL at 15:55

## 2020-11-03 RX ADMIN — LORAZEPAM 1 MG: 1 TABLET ORAL at 21:20

## 2020-11-03 RX ADMIN — SUCRALFATE 1 G: 1 TABLET ORAL at 06:48

## 2020-11-03 RX ADMIN — THERA TABS 1 TABLET: TAB at 08:04

## 2020-11-03 RX ADMIN — TRAZODONE HYDROCHLORIDE 100 MG: 50 TABLET ORAL at 21:20

## 2020-11-03 RX ADMIN — SUCRALFATE 1 G: 1 TABLET ORAL at 11:47

## 2020-11-03 RX ADMIN — FOLIC ACID 1 MG: 1 TABLET ORAL at 08:04

## 2020-11-03 RX ADMIN — SUCRALFATE 1 G: 1 TABLET ORAL at 21:21

## 2020-11-03 NOTE — PROGRESS NOTES
Progress Note    Patient: George Roberts MRN: [id number]  SSN: [id number]    YOB: 1980  Age: 44 y.o. Sex: male      Admit Date: 10/30/2020    LOS: 4 days     Subjective: The patient is an almost 42-year-old male who is seen for evaluation today. He is continuing on his detoxification. He is tolerating it well he had a little shakiness in his hands. A little sweatiness. Some anxiety but no vomiting or diarrhea. He has been tolerating detox appropriately. His liver function test improved slightly they were elevated significantly when he was admitted. No falls or injuries. He is eating well. His bowel movements have been appropriate. Objective:     Vitals:    11/01/20 2000 11/02/20 0006 11/02/20 0805 11/02/20 2048   BP: (!) 145/95 102/70 103/77 (!) 140/67   Pulse: 94 74 95 98   Resp: 18 20 18 18   Temp: 98 °F (36.7 °C) 97.9 °F (36.6 °C) 97.7 °F (36.5 °C) 98.4 °F (36.9 °C)   SpO2: 99% 98% 97% 97%   Weight:       Height:            Intake and Output:  Current Shift: No intake/output data recorded. Last three shifts: 11/01 1901 - 11/03 0700  In: 240 [P.O.:240]  Out: -     Physical Exam:   Afebrile. Vital signs are stable. The lungs are clear the abdomen is benign. Extremities are clear he is in no distress. No adenopathy. No edema. He is pleasant today and cooperative. Gait is normal he is eating appropriately as well. His CIWA score is 7  Assessment: Chronic alcohol dependence, acute alcohol detoxification, elevated liver function testing with probable gastritis  Lab/Data Review: Awaiting his hepatitis studies. He will be discharging to home in the near future. He has no new lab work available at this time           Assessment:   1chronic alcohol dependence  2acute alcohol detoxification  3elevated liver function testing    Plan:   Stable. Were planning his discharge in the next several days.   He will need outpatient follow-up and evaluation of his liver as well.    Signed By: Riley Dallas MD     November 3, 2020

## 2020-11-03 NOTE — ROUTINE PROCESS
Verbal shift change report given to Nora Crow (oncoming nurse) by Efrain Kinsey RN (offgoing nurse). Report included the following information Kardex, MAR and Recent Results.

## 2020-11-03 NOTE — PROGRESS NOTES
Dr. Ila Mann in for assessment. Meeting between doctor and nurse took place. Plan is to continue Ativan taper. Both agree in plan.

## 2020-11-03 NOTE — PROGRESS NOTES
Patient complaint of pain in back C spine states has chronic pain in that area for years. States pain 6/10 on scale. Patient requested Ibuprofen for pain. Ibprofen 400mg given as ordered. . Will continue to monitor.

## 2020-11-03 NOTE — GROUP NOTE
[facility] GROUP DOCUMENTATION INDIVIDUAL Group Therapy Note Date: 11/3/2020 Group Start Time: 1100 Group End Time: 6334 Group Topic: Relapse Prevention/Role Play Group SHF 3 DETOX Ana Maria Alston [facility] GROUP DOCUMENTATION GROUP Group Therapy Note Topic: People, Places and Places Attendees: 3 Attendance: Attended Patient's Goal:  Able to indentify relapse triggers including interpersonal/social and familial factors Interventions/techniques: Informed and Supported Follows Directions: Followed directions Interactions: Interacted appropriately Mental Status: Calm Behavior/appearance: Cooperative Goals Achieved: Able to engage in interactions Additional Notes:   
People, places and things playa vital role in encouraging or discouraging substance use, no matter the stage of addiction or recovery. The phrase people, places, and things refers to triggers. Triggers are the problematic cues that lead to a craving, which is a strongoften overwhelming desire to obtain and use your substance of abuse. Magi Bolaños was able to identify 3 people, places and things that he should avoid to reduce his risk of relapse. The patient is progressing towards the treatment goals. Leanna Adler

## 2020-11-03 NOTE — ROUTINE PROCESS
Verbal shift change report given to Brittany Fuentes RN (oncoming nurse) by Minoo Rosa RN (offgoing nurse). Report included the following information Kardex, Intake/Output, MAR and Recent Results.

## 2020-11-03 NOTE — GROUP NOTE
[facility] GROUP DOCUMENTATION INDIVIDUAL Group Therapy Note Date: 11/3/2020 Group Start Time: 36 Group End Time: 2107 Group Topic: Relapse Prevention/Role Play Group SHF 3 DETOX Batool Lanier [facility] GROUP DOCUMENTATION GROUP Group Therapy Note Topic: Stages of Change Attendees: 3 Attendance: Attended Patient's Goal:  Able to indentify relapse triggers including interpersonal/social and familial factors Interventions/techniques: Informed and Supported Follows Directions: Followed directions Interactions: Interacted appropriately Mental Status: Calm Behavior/appearance: Attentive and Cooperative Goals Achieved: Able to listen to others, Able to reflect/comment on own behavior, Able to self-disclose, Identified triggers and Identified relapse prevention strategies Additional Notes:  Patient was supported in the discussion of the Stages of Changes. There are five main stages in the Stages of Change: Precontemplation, contemplation, preparation, action and maintenance. Relapse can occur at or between stages. These stages can be represented as a cycle, and in theory, people should go through these stages in sequence. In reality, people can jump about between stages, go backward and forward, and even be in more than one stage at a time. Patient was supported in activity to assess what stage of changes he is currently in. Ehsan Man is assessed with being the pre contemplation stage of change. The patient is progressing towards his treatment goals. Sherrill Garcia

## 2020-11-03 NOTE — GROUP NOTE
[facility]  GROUP DOCUMENTATION INDIVIDUAL Group Therapy Note Date: 11/3/2020 Group Start Time: 7452 Group End Time: 1000 Group Topic: [employer] SHF 3 DETOX Calvin Jones [facility] GROUP DOCUMENTATION GROUP Group Therapy Note Topic: Community & Spirituality Attendees: 3 Attendance: Attended Patient's Goal:  Attends activities and groups; Interventions/techniques: Informed and Supported Follows Directions: Followed directions Interactions: Interacted appropriately Mental Status: Calm Behavior/appearance: Attentive and Cooperative Goals Achieved: Able to engage in interactions Additional Notes:  Staff supported patient with an overview of today's schedule, group rules, hygiene, smoke breaks, medication time, tidiness of the room and concerns. Liliana Bardales was concerned with his follow on treatment for discharge. Patient was supported in connecting with [facility] for follow on treatment. Dannial Pea

## 2020-11-03 NOTE — BH NOTES
Chemical Dependency/Substance Abuse Therapist/Counselor Documentation    /THERAPIST PROGRESS NOTE    CURRENT STATUS OF PATIENT: Orientation (check one) [x] Person [x] Place [x] Time [x] Purpose    (Patient voiced concerns: Pt voiced concerns over when he would be able to smoke. Attitude/Behavior toward Examiner:   [x] Appropriate [x] Cooperative [] Aggressive [] Argumentative [] Angry [] Apathetic [] Passive  [] Childlike [] Interactive [] Guarded [] Demanding [] Dramatic [] Evasive [] Hostile [] Irritable [] Manipulative   [] Uncooperative [] Difficult to redirect [] Isolative-Withdrawal [] Sad-Tearful [] Suspicious [] Defensive    TREATMENT PLAN PROBLEM BEING ADDRESS: Medical Detox and Relapse Prevention    MODALITY:  [x] Individual Therapy  [x] Group Therapy  [] Family Therapy with Client Present  [] Family Therapy without Client Present  [] Multi-Family Group Therapy  [x] Spirituality Psychotherapy Group  [x] Psycho-Edutherapy Group    RISK OF HARM:    [x] None identified    [] Self injurious behavior  [] Threatening others without a plan   [] Actively Suicidal with plan  [] Threatening others with plan  [] Suicidal Thoughts without plan [] Weapon in the home  [] Means to complete a plan    Comments: Cipriano Eisenmenger participated in  [facility] Spirituality Group, individual sessions, discharge planning. Participated in the morning and evening groups.     General Appearance: [] Normal [] Disheveled [] Emaciated [] Obese [] Poor Hygiene    Dress: [x] Appropriate [] Eccentric [] Seductive [] Bizarre    Mood: [x] Euthymic/normal [] Dysphoric/unease [] Anxious [] Agitated [] Dysthymic [] Depressed [] Euphoric [] Pleasant  [] Bright [] Angry [] Manic    Affect: [x] Appropriate [] Inappropriate [] Labile [] Constricted [] Blunted [] Flat [] Lethargic [] Preoccupied    THOUGHT CONTENT:   [x] Remains focused on topic/thought control [] Unable to remain focused on topic [] Blocking [] Disorganized  [] Confused [] Preoccupied [] Flight of ideas [] Tangential [] Delusional thought content [] Persecutory  [] Bizarre [] Grandeur [] Guilt [] Somatic     PERCEPTION: [x] Normal [] Hallucinations [] Auditory [] Visual [] Tactile [] Olfactory/smell [] Gustatory/taste    Briefly summarize the specifics of the identified symptoms and/or behaviors listed and progress towards improvement and/or group interactions: Maria Elena Palacios is focused on learning and practicing interventions. He reported he is currently using coloring to help him focus his thoughts away from drug use. MET (Motivational Enhancement Therapy): Based on discussions and interactions with the patient the patient falls within the following stage of treatment:   [] Pre-contemplation: Denial of illness with no need to change  [x] Contemplation: Awareness of problem and considering change  [] Determination: Willing to change and open to possible change  [] Action: Actively involved in recovery/treatment demonstrates a willing to make plans to change  [] Relapse Prevention: Able to verbalize plans to prevent relapse, 7 day plan    Briefly summarize the patient's interactions/discussions indicating the current or change in level of therapeutic treatment: Maria Elena Palacios is actively considering change and what he needs to do to enable him to stay sober. TREATMENT PROVIDED:  [] Developed/Reviewed Crisis Prevention Plan  [] Discussed/Reviewed Treatment Goals on Treatment Plan  [x] Discussed/Reviewed Discharge Planning   [] Discussed/Reviewed the patient goals of treatment  [] Refused/Unable to participate in discharge planning [] Other:     RECOMMENDATIONS: [] Change current therapeutic focus  [] Change treatment goals/objectives on the treatment plan. Continue current treatment goals.     CONTINUED PLAN OF CARE:  Continue current Plan of Care  DISCHARGE PLANNING:   [x] Has identified a family support system   [] Patient has not identified a family support system  [x] Has connected with sober/clean support system  [] Patient has not connected with sober/clean support system  [] Patient uses special equipment at home and equipment is in the home  [] Has identified community support    [] Patient has not been able to identify community support  Barriers to Discharge:  [] Difficulty returning to present living arrangement  [] Will require assistance with placement post discharge  [] There is no local substance abuse disorder programs available to the patient  [] Needs referral to    Discharge Planning Comments: Liliana Bardales expresses that he plans to receive outpatient SA treatment from [facility] after his discharge.

## 2020-11-04 VITALS
HEIGHT: 73 IN | OXYGEN SATURATION: 99 % | TEMPERATURE: 98 F | RESPIRATION RATE: 18 BRPM | SYSTOLIC BLOOD PRESSURE: 117 MMHG | HEART RATE: 96 BPM | WEIGHT: 151 LBS | DIASTOLIC BLOOD PRESSURE: 72 MMHG | BODY MASS INDEX: 20.01 KG/M2

## 2020-11-04 PROCEDURE — 99238 HOSP IP/OBS DSCHRG MGMT 30/<: CPT | Performed by: FAMILY MEDICINE

## 2020-11-04 PROCEDURE — 74011250637 HC RX REV CODE- 250/637: Performed by: INTERNAL MEDICINE

## 2020-11-04 RX ADMIN — SUCRALFATE 1 G: 1 TABLET ORAL at 06:35

## 2020-11-04 RX ADMIN — PANTOPRAZOLE SODIUM 40 MG: 40 TABLET, DELAYED RELEASE ORAL at 06:35

## 2020-11-04 NOTE — DISCHARGE INSTRUCTIONS
Patient Education        Learning About Alcohol Use Disorder  What is alcohol use disorder? Alcohol use disorder means that a person drinks alcohol even though it causes harm to themselves or others. It can range from mild to severe. The more signs of this disorder you have, the more severe it may be. Moderate to severe alcohol use disorder is sometimes called addiction. People who have it may find it hard to control their use of alcohol. People who have this disorder may argue with others about how much they're drinking. Their job may be affected because of drinking. They may drink when it's dangerous or illegal, such as when they drive. They also may have a strong need, or craving, to drink. They may feel like they must drink just to get by. Their drinking may increase their risk of getting hurt or being in a car crash. Over time, drinking too much alcohol may cause health problems. These may include high blood pressure, liver problems, or problems with digestion. What are the signs? Maybe you've wondered about your alcohol habits, or how to tell if your drinking is becoming a problem. Here are some of the signs of alcohol use disorder. You may have it if you have two or more of the following signs:  · You drink larger amounts of alcohol than you ever meant to. Or you've been drinking for a longer time than you ever meant to. · You can't cut down or control your use. Or you constantly wish you could cut down. · You spend a lot of time getting or drinking alcohol or recovering from its effects. · You have strong cravings for alcohol. · You can no longer do your main jobs at work, at school, or at home. · You keep drinking alcohol, even though your use hurts your relationships. · You have stopped doing important activities because of your alcohol use. · You drink alcohol in situations where doing so is dangerous. · You keep drinking alcohol even though you know it's causing health problems.   · You need more and more alcohol to get the same effect, or you get less effect from the same amount over time. This is called tolerance. · You have uncomfortable symptoms when you stop drinking alcohol or use less. This is called withdrawal.  Alcohol use disorder can range from mild to severe. The more signs you have, the more severe the disorder may be. Moderate to severe alcohol use disorder is sometimes called addiction. You might not realize that your drinking is a problem. You might not drink large amounts when you drink. Or you might go for days or weeks between drinking episodes. But even if you don't drink very often, your drinking could still be harmful and put you at risk. How is alcohol use disorder treated? Getting help is up to you. But you don't have to do it alone. There are many people and kinds of treatments that can help. Treatment for alcohol use disorder can include:  · Group therapy, one or more types of counseling, and alcohol education. · Medicines that help to:  ? Reduce withdrawal symptoms and help you safely stop drinking. ? Reduce cravings for alcohol. · Support groups. These groups include Alcoholics Anonymous and [employer] (Self-Management and Recovery Training). Some people are able to stop or cut back on drinking with help from a counselor. People who have moderate to severe alcohol use disorder may need medical treatment. They may need to stay in a hospital or treatment center. You may have a treatment team to help you. This team may include a psychologist or psychiatrist, counselors, doctors, social workers, nurses, and a . A  helps plan and manage your treatment. Follow-up care is a key part of your treatment and safety. Be sure to make and go to all appointments, and call your doctor if you are having problems. It's also a good idea to know your test results and keep a list of the medicines you take. Where can you learn more?   Go to http://www.gray.com/  Enter [id number] in the search box to learn more about \"Learning About Alcohol Use Disorder. \"  Current as of: June 29, 2020               Content Version: 12.6  © 9421-8518 [employer]. Care instructions adapted under license by [employer] (which disclaims liability or warranty for this information). If you have questions about a medical condition or this instruction, always ask your healthcare professional. [place] [address] any warranty or liability for your use of this information.

## 2020-11-04 NOTE — BH NOTES
Chemical Dependency/Substance Abuse Therapist/Counselor Documentation    /THERAPIST PROGRESS NOTE    CURRENT STATUS OF PATIENT: Orientation (check one) [x] Person [x] Place [x] Time [x] Purpose    (Patient voiced concerns:  No concerns voiced. )    Attitude/Behavior toward Examiner:   [x] Appropriate [x] Cooperative [] Aggressive [] Argumentative [] Angry [] Apathetic [] Passive  [] Childlike [] Interactive [] Guarded [] Demanding [] Dramatic [] Evasive [] Hostile [] Irritable [] Manipulative   [] Uncooperative [] Difficult to redirect [] Isolative-Withdrawal [] Sad-Tearful [] Suspicious [] Defensive    TREATMENT PLAN PROBLEM BEING ADDRESS  Medical detox and Relapse Prevention    MODALITY:  [x] Individual Therapy  [x] Group Therapy  [] Family Therapy with Client Present  [] Family Therapy without Client Present  [] Multi-Family Group Therapy  [x] Spirituality Psychotherapy Group  [] Psycho-Edutherapy Group    RISK OF HARM:    [x] None identified    [] Self injurious behavior  [] Threatening others without a plan   [] Actively Suicidal with plan  [] Threatening others with plan  [] Suicidal Thoughts without plan [] Weapon in the home  [] Means to complete a plan    Comments:  Ramin Meyer participated in community and spirituality group, individual sessions, discharge planning, participated in morning groups and evening groups.     General Appearance: [x] Normal [] Disheveled [] Emaciated [] Obese [] Poor Hygiene    Dress: [x] Appropriate [] Eccentric [] Seductive [] Bizarre    Mood: [x] Euthymic/normal [] Dysphoric/unease [] Anxious [] Agitated [] Dysthymic [] Depressed [] Euphoric [] Pleasant  [] Bright [] Angry [] Manic    Affect: [x] Appropriate [] Inappropriate [] Labile [] Constricted [] Blunted [] Flat [] Lethargic [] Preoccupied    THOUGHT CONTENT:   [x] Remains focused on topic/thought control [] Unable to remain focused on topic [] Blocking [] Disorganized  [] Confused [] Preoccupied [] Flight of ideas [] Tangential [] Delusional thought content [] Persecutory  [] Bizarre [] Grandeur [] Guilt [] Somatic     PERCEPTION: [x] Normal [] Hallucinations [] Auditory [] Visual [] Tactile [] Olfactory/smell [] Gustatory/taste    Briefly summarize the specifics of the identified symptoms and/or behaviors listed and progress towards improvement and/or group interactions: Pt has completed detpx amd will be discharged on 11/04/2020 to return home to attend outpatient  treatment. MET (Motivational Enhancement Therapy): Based on discussions and interactions with the patient the patient falls within the following stage of treatment:   [] Pre-contemplation: Denial of illness with no need to change  [x] Contemplation: Awareness of problem and considering change  [] Determination: Willing to change and open to possible change  [] Action: Actively involved in recovery/treatment demonstrates a willing to make plans to change  [] Relapse Prevention: Able to verbalize plans to prevent relapse, 7 day plan    Briefly summarize the patient's interactions/discussions indicating the current or change in level of therapeutic treatment: Patient is able to voice that he realizes he need further treatment to support him in remaining in sobrieity. TREATMENT PROVIDED:  [] Developed/Reviewed Crisis Prevention Plan  [] Discussed/Reviewed Treatment Goals on Treatment Plan  [x] Discussed/Reviewed Discharge Planning   [] Discussed/Reviewed the patient goals of treatment  [] Refused/Unable to participate in discharge planning [] Other:     RECOMMENDATIONS: [] Change current therapeutic focus  [] Change treatment goals/objectives on the treatment plan  No changes in treatment recommended.   CONTINUED PLAN OF CARE: Continue current Plan of Care    DISCHARGE PLANNING:   [x] Has identified a family support system   [] Patient has not identified a family support system  [x] Has connected with sober/clean support system  [] Patient has not connected with sober/clean support system  [] Patient uses special equipment at home and equipment is in the home  [] Has identified community support    [] Patient has not been able to identify community support  Barriers to Discharge:  [] Difficulty returning to present living arrangement  [] Will require assistance with placement post discharge  [] There is no local substance abuse disorder programs available to the patient  [] Needs referral to     Discharge Planning Comments: Patient has completed detox program and will be discharged tomorrow (11/04/2020).   He has an appointment with Andrea 296 on 11/05/2020 to be assessed for an outpatient SA Treatment Program.

## 2020-11-04 NOTE — GROUP NOTE
[facility] GROUP DOCUMENTATION INDIVIDUAL Group Therapy Note Date: 11/4/2020 Group Start Time: 4293 Group End Time: 0797 Group Topic: Education Group - Inpatient SHF 3 DETOX Jade Badillo [facility] GROUP DOCUMENTATION GROUP Group Therapy Note Topics: Post -Acute Withdrwarl Syndrome (PAWS) Attendees: 5 Attendance: Attended Patient's Goal: Relapse prevention plan in place to include housing/aftercare, leisure activities and spirtuality Interventions/techniques: Informed and Supported Follows Directions: Followed directions Interactions: Interacted appropriately Mental Status: Calm Behavior/appearance: Attentive and Cooperative Goals Achieved: Able to engage in interactions, Able to listen to others, Able to give feedback to another, Able to self-disclose, Identified feelings, Identified triggers and Identified relapse prevention strategies Additional Notes:  Clint Gutierrez was supported in the discussion on Post -Acute Withdrwarl Syndrome (PAWS). Patient was able to verbalize and understanding of the definition of PAWS. Who does PAWS affect? When does it start and how long does it last? What are the symptoms of PAWS? What can I do about PAWS? Clint Gutierrez is progressing towards his treatment goals. Frankie Graham

## 2020-11-04 NOTE — PROGRESS NOTES
Shantal Mosquera received written and oral discharge instructions and he verbalized understanding. He was not given any prescriptions. Shantal Mosquera is to follow-up with his PCP to discuss diagnosis of Alcohol Use Disorder . He was given literature on preventing blood clots and on smoking cessation. No complaints voiced. All belongings were returned to Shantal Mosquera.

## 2020-11-04 NOTE — ROUTINE PROCESS
Verbal shift change report given to Violet Villafuerte RN (oncoming nurse) by Enrico Da Silva RN (offgoing nurse). Report included the following information Kardex, Intake/Output, MAR and Med Rec Status.

## 2020-11-04 NOTE — ROUTINE PROCESS
Verbal shift change report given to Mary Anne Burden RN (oncoming nurse) by ANIRUDH Ma RN (offgoing nurse). Report included the following information Kardex, Intake/Output, MAR and Recent Results.

## 2020-11-04 NOTE — GROUP NOTE
[facility] GROUP DOCUMENTATION INDIVIDUAL Group Therapy Note Date: 11/4/2020 Group Start Time: 1000 Group End Time: 0894 Group Topic: Relapse Prevention/Role Play Group SHF 3 DETOX [address] [facility] GROUP DOCUMENTATION GROUP Group Therapy Note Topic: Triggers Attendees: 5 Attendance: Attended Patient's Goal:  Able to identify relapse triggers including interpersonal/social and familial factors Interventions/techniques: Informed and Supported Follows Directions: Followed directions Interactions: Interacted appropriately Mental Status: Calm Behavior/appearance: Attentive, Cooperative and Neatly groomed Goals Achieved: Able to engage in interactions, Able to listen to others, Able to reflect/comment on own behavior, Able to self-disclose, Identified triggers, Identified relapse prevention strategies and Identified resources and support systems Additional Notes:  Brandi Sprague was supported with completing an activity which identified emotional state, people, places, things, thoughts and activities which could be potential triggers. He was able to describe her biggest triggers, strategies for avoiding and reducing triggers and ways to deal with triggers when they can not directly be avoided. He is progressing towards his treatment goals. Latrell Blum

## 2020-11-04 NOTE — GROUP NOTE
[facility]  GROUP DOCUMENTATION INDIVIDUAL Group Therapy Note Date: 11/3/2020 Group Start Time: 0021 Group End Time: 6342 Group Topic: AA/NA 
 
SHF 3 DETOX Wing Noe [facility] GROUP DOCUMENTATION GROUP Group Therapy Note Topic:  The 12 Steps Program 
 
Attendees: 3 Attendance: Attended Patient's Goal: Attends activities and groups, Verbalizes abstinence as an achievable goal, Identify lifestyle changes to support long term sobriety such as vocation, employment, education, and legal issues. Interventions/techniques: Informed and Reinforced Follows Directions: Followed directions Interactions: Interacted appropriately Mental Status: Calm Behavior/appearance: Attentive, Cooperative and Motivated Goals Achieved: Able to engage in interactions, Able to listen to others and Able to receive feedback Additional Notes: Rickie Augustin was supported in reviewing and understanding the 12 Steps Program.  He shared that he has attend AA meetings in the past but has never worked the [employer] with a mentor. Rickie Augustin is working toward the completion of his treatment goals. Niru Irwin

## 2020-11-04 NOTE — DISCHARGE SUMMARY
Admission diagnosis chronic alcohol dependence, alcoholic hepatitis, reactive hypertension, alcohol detox  Discharge diagnosis: Same  History of present illness: The patient is a 79-year-old male with a long history of regular and daily alcohol use up to a gallon or half gallon a day he has had some chronic discomfort in his neck and back. He now has Medicaid. He has been in the detox program previously and did well. He is in a stable relationship. He says that he has been on medication for depression in the past.  He does chew tobacco.  Review of systems no withdrawal today the lungs are clear no sore throat no abdominal pain no rashes are noted. He has some chronic neck and back discomfort but it is stable. Venous. No urinary symptoms no indigestion or epigastric pain. No rashes no edema. Physical exam shows a blood pressure 128/75 a pulse of 90 respiration 18 temperature 98.3 his CIWA score is 3. The lungs are clear the cardiovascular exam showed a regular rate and rhythm no rashes are seen the abdomen is soft. He is pleasant cooperative oriented x3 no acute issues. His gait is normal he is eating appropriately the abdomen is soft no hepatosplenomegaly. Not clinically anxious or depressed  Lab: The electrolytes were normal.  The blood sugar decreased from 1 52-1 10. The SGOT and SGPT decreased from 115 and 10 4-50 4-1 04 hepatitis study was unremarkable. His CBC was normal urinalysis was normal as well  Hospital course: The patient tolerated his hospitalization extremely well. He had some anxiety and sweatiness early in his detox but tolerated extremely well he did not require extra medication for pain or blood pressure. Plan: He has appointment set up for [facility]. He is insistent on going home rather than a 30-day program no medication at the time of discharge. Will need to follow-up with a liver evaluation and liver function test in our office.   An appointment was set up for him for follow-up.

## 2020-11-04 NOTE — GROUP NOTE
[facility] GROUP DOCUMENTATION INDIVIDUAL Group Therapy Note Date: 11/3/2020 Group Start Time: 7143 Group End Time: 8944 Group Topic: Education Group - Inpatient SHF 3 DETOX Dufm Biju [facility] GROUP DOCUMENTATION GROUP Group Therapy Note Topic:  Basic Hygiene Attendees: 3 Attendance: Attended Patient's Goal: Attends activities and groups,Will identify negative impact of chemical dependency including the use of tobacco, alcohol, and other substances, Verbalizes abstinence as an achievable goal 
  
Interventions/techniques: Informed, Reinforced and Supported Follows Directions: Followed directions Interactions: Interacted appropriately Mental Status: Calm Behavior/appearance: Attentive, Cooperative and Neatly groomed Goals Achieved: Able to engage in interactions, Able to listen to others, Able to give feedback to another and Able to receive feedback Additional Notes: Liliana Bardales was supported in the review of basic hygiene. The importance of washing hands, bathing, brushing teeth, getting proper sleep, and eating a balanced diet as to maintaining sobriety was discussed. Lilianawilliam Bardales is progressing toward completion of his treatment goals. Contreras Sprague

## 2020-11-04 NOTE — PROGRESS NOTES
Dr. Jhaveri Fail in for daily assessment. Meeting between doctor and nurse took place. Plan is to discharge patient today. Pt agrees with plan.

## 2020-12-07 ENCOUNTER — OFFICE VISIT (OUTPATIENT)
Dept: FAMILY MEDICINE CLINIC | Age: 40
End: 2020-12-07
Payer: MEDICAID

## 2020-12-07 VITALS
WEIGHT: 160 LBS | SYSTOLIC BLOOD PRESSURE: 140 MMHG | TEMPERATURE: 96.6 F | OXYGEN SATURATION: 97 % | BODY MASS INDEX: 21.67 KG/M2 | HEART RATE: 111 BPM | HEIGHT: 72 IN | DIASTOLIC BLOOD PRESSURE: 95 MMHG

## 2020-12-07 DIAGNOSIS — F41.9 ANXIETY: ICD-10-CM

## 2020-12-07 DIAGNOSIS — R79.89 ELEVATED LFTS: ICD-10-CM

## 2020-12-07 DIAGNOSIS — G89.29 CHRONIC BILATERAL THORACIC BACK PAIN: ICD-10-CM

## 2020-12-07 DIAGNOSIS — M54.6 CHRONIC BILATERAL THORACIC BACK PAIN: ICD-10-CM

## 2020-12-07 DIAGNOSIS — M54.2 CERVICALGIA: ICD-10-CM

## 2020-12-07 DIAGNOSIS — F51.01 PRIMARY INSOMNIA: Primary | ICD-10-CM

## 2020-12-07 PROCEDURE — 99214 OFFICE O/P EST MOD 30 MIN: CPT | Performed by: FAMILY MEDICINE

## 2020-12-07 RX ORDER — BUSPIRONE HYDROCHLORIDE 15 MG/1
15 TABLET ORAL 3 TIMES DAILY
Qty: 60 TAB | Refills: 2 | Status: SHIPPED | OUTPATIENT
Start: 2020-12-07 | End: 2020-12-24

## 2020-12-07 RX ORDER — TRAZODONE HYDROCHLORIDE 150 MG/1
150 TABLET ORAL
Qty: 30 TAB | Refills: 3 | Status: SHIPPED | OUTPATIENT
Start: 2020-12-07 | End: 2020-12-24

## 2020-12-07 RX ORDER — BACLOFEN 10 MG/1
10 TABLET ORAL 3 TIMES DAILY
Qty: 60 TAB | Refills: 1 | Status: SHIPPED | OUTPATIENT
Start: 2020-12-07 | End: 2020-12-24

## 2020-12-07 RX ORDER — BACLOFEN 10 MG/1
10 TABLET ORAL 2 TIMES DAILY
Qty: 60 TAB | Refills: 1 | Status: SHIPPED | OUTPATIENT
Start: 2020-12-07 | End: 2020-12-24

## 2020-12-07 NOTE — PROGRESS NOTES
Tosha Corrigan presents today for   Chief Complaint   Patient presents with    Follow-up       Is someone accompanying this pt? no    Is the patient using any DME equipment during OV? no    Depression Screening:  No flowsheet data found. Learning Assessment:  No flowsheet data found. Health Maintenance reviewed and discussed and ordered per Provider. Health Maintenance Due   Topic Date Due    Pneumococcal 0-64 years (1 of 1 - PPSV23) 11/16/1986    DTaP/Tdap/Td series (1 - Tdap) 11/16/2001    Flu Vaccine (1) 09/01/2020    Lipid Screen  11/16/2020   . Coordination of Care:  1. Have you been to the ER, urgent care clinic since your last visit? Hospitalized since your last visit? no    2. Have you seen or consulted any other health care providers outside of the [address] [place] [address] since your last visit? Include any pap smears or colon screening.  no    Providers orders his own labs, orders for colonoscopy, mammograms and referrals as needed    Last UDS Checked no  Last Pain contract signed: no
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
## 2020-12-08 ENCOUNTER — HOSPITAL ENCOUNTER (OUTPATIENT)
Dept: LAB | Age: 40
Discharge: HOME | End: 2020-12-08

## 2020-12-08 ENCOUNTER — HOSPITAL ENCOUNTER (OUTPATIENT)
Dept: GENERAL RADIOLOGY | Age: 40
Discharge: HOME | End: 2020-12-08
Attending: FAMILY MEDICINE
Payer: MEDICAID

## 2020-12-08 DIAGNOSIS — M54.2 CERVICALGIA: ICD-10-CM

## 2020-12-08 DIAGNOSIS — G89.29 CHRONIC BILATERAL THORACIC BACK PAIN: ICD-10-CM

## 2020-12-08 DIAGNOSIS — M54.6 CHRONIC BILATERAL THORACIC BACK PAIN: ICD-10-CM

## 2020-12-08 PROCEDURE — 72040 X-RAY EXAM NECK SPINE 2-3 VW: CPT

## 2020-12-08 PROCEDURE — 72070 X-RAY EXAM THORAC SPINE 2VWS: CPT

## 2020-12-11 ENCOUNTER — TELEPHONE (OUTPATIENT)
Dept: FAMILY MEDICINE CLINIC | Age: 40
End: 2020-12-11

## 2020-12-24 ENCOUNTER — VIRTUAL VISIT (OUTPATIENT)
Dept: FAMILY MEDICINE CLINIC | Age: 40
End: 2020-12-24
Payer: MEDICAID

## 2020-12-24 DIAGNOSIS — Z13.29 SCREENING FOR ENDOCRINE, METABOLIC AND IMMUNITY DISORDER: ICD-10-CM

## 2020-12-24 DIAGNOSIS — Z13.0 SCREENING FOR ENDOCRINE, METABOLIC AND IMMUNITY DISORDER: ICD-10-CM

## 2020-12-24 DIAGNOSIS — F41.9 ANXIETY AND DEPRESSION: Primary | ICD-10-CM

## 2020-12-24 DIAGNOSIS — F32.A ANXIETY AND DEPRESSION: Primary | ICD-10-CM

## 2020-12-24 DIAGNOSIS — Z13.228 SCREENING FOR ENDOCRINE, METABOLIC AND IMMUNITY DISORDER: ICD-10-CM

## 2020-12-24 PROCEDURE — 99213 OFFICE O/P EST LOW 20 MIN: CPT | Performed by: NURSE PRACTITIONER

## 2020-12-24 RX ORDER — ESCITALOPRAM OXALATE 10 MG/1
10 TABLET ORAL DAILY
COMMUNITY
End: 2020-12-24 | Stop reason: SDUPTHER

## 2020-12-24 RX ORDER — ESCITALOPRAM OXALATE 10 MG/1
10 TABLET ORAL DAILY
Qty: 90 TAB | Refills: 0 | Status: SHIPPED | OUTPATIENT
Start: 2020-12-24 | End: 2021-01-21

## 2020-12-24 RX ORDER — DICLOFENAC SODIUM 10 MG/G
GEL TOPICAL 4 TIMES DAILY
COMMUNITY
End: 2021-01-21 | Stop reason: SDUPTHER
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
## 2021-01-12 ENCOUNTER — HOSPITAL ENCOUNTER (OUTPATIENT)
Dept: LAB | Age: 41
Discharge: HOME | End: 2021-01-12
Payer: MEDICAID

## 2021-01-12 ENCOUNTER — LAB ONLY (OUTPATIENT)
Dept: FAMILY MEDICINE CLINIC | Age: 41
End: 2021-01-12
Payer: MEDICAID

## 2021-01-12 DIAGNOSIS — F41.9 ANXIETY: ICD-10-CM

## 2021-01-12 DIAGNOSIS — Z01.89 ENCOUNTER FOR LABORATORY EXAMINATION: Primary | ICD-10-CM

## 2021-01-12 DIAGNOSIS — Z13.228 SCREENING FOR ENDOCRINE, METABOLIC AND IMMUNITY DISORDER: ICD-10-CM

## 2021-01-12 DIAGNOSIS — Z13.0 SCREENING FOR ENDOCRINE, METABOLIC AND IMMUNITY DISORDER: ICD-10-CM

## 2021-01-12 DIAGNOSIS — Z13.29 SCREENING FOR ENDOCRINE, METABOLIC AND IMMUNITY DISORDER: ICD-10-CM

## 2021-01-12 LAB
ALBUMIN SERPL-MCNC: 4.1 G/DL (ref 3.4–5)
ALBUMIN/GLOB SERPL: 1.3 {RATIO} (ref 0.8–1.7)
ALP SERPL-CCNC: 90 U/L (ref 45–117)
ALT SERPL-CCNC: 41 U/L (ref 16–61)
ANION GAP SERPL CALC-SCNC: 6 MMOL/L (ref 3–18)
AST SERPL-CCNC: 18 U/L (ref 10–38)
BILIRUB SERPL-MCNC: 1 MG/DL (ref 0.2–1)
BUN SERPL-MCNC: 18 MG/DL (ref 7–18)
BUN/CREAT SERPL: 17 (ref 12–20)
CALCIUM SERPL-MCNC: 9.1 MG/DL (ref 8.5–10.1)
CHLORIDE SERPL-SCNC: 104 MMOL/L (ref 100–111)
CHOLEST SERPL-MCNC: 287 MG/DL
CO2 SERPL-SCNC: 30 MMOL/L (ref 21–32)
CREAT SERPL-MCNC: 1.08 MG/DL (ref 0.6–1.3)
ERYTHROCYTE [DISTWIDTH] IN BLOOD BY AUTOMATED COUNT: 12.1 % (ref 11.6–14.5)
EST. AVERAGE GLUCOSE BLD GHB EST-MCNC: 94 MG/DL
GLOBULIN SER CALC-MCNC: 3.2 G/DL (ref 2–4)
GLUCOSE SERPL-MCNC: 107 MG/DL (ref 74–99)
HBA1C MFR BLD: 4.9 % (ref 4.2–5.6)
HCT VFR BLD AUTO: 47.3 % (ref 36–48)
HDLC SERPL-MCNC: 47 MG/DL (ref 40–60)
HDLC SERPL: 6.1 {RATIO} (ref 0–5)
HGB BLD-MCNC: 15.9 G/DL (ref 13–16)
LDLC SERPL CALC-MCNC: 209.8 MG/DL (ref 0–100)
LIPID PROFILE,FLP: ABNORMAL
MCH RBC QN AUTO: 33.2 PG (ref 24–34)
MCHC RBC AUTO-ENTMCNC: 33.6 G/DL (ref 31–37)
MCV RBC AUTO: 98.7 FL (ref 74–97)
PLATELET # BLD AUTO: 235 K/UL (ref 135–420)
PMV BLD AUTO: 11.7 FL (ref 9.2–11.8)
POTASSIUM SERPL-SCNC: 4.3 MMOL/L (ref 3.5–5.5)
PROT SERPL-MCNC: 7.3 G/DL (ref 6.4–8.2)
RBC # BLD AUTO: 4.79 M/UL (ref 4.7–5.5)
SODIUM SERPL-SCNC: 140 MMOL/L (ref 136–145)
TRIGL SERPL-MCNC: 151 MG/DL (ref ?–150)
TSH SERPL DL<=0.05 MIU/L-ACNC: 2.11 UIU/ML (ref 0.36–3.74)
VLDLC SERPL CALC-MCNC: 30.2 MG/DL
WBC # BLD AUTO: 6.1 K/UL (ref 4.6–13.2)

## 2021-01-12 PROCEDURE — 80061 LIPID PANEL: CPT

## 2021-01-12 PROCEDURE — 83036 HEMOGLOBIN GLYCOSYLATED A1C: CPT

## 2021-01-12 PROCEDURE — 36415 COLL VENOUS BLD VENIPUNCTURE: CPT

## 2021-01-12 PROCEDURE — 36415 COLL VENOUS BLD VENIPUNCTURE: CPT | Performed by: NURSE PRACTITIONER

## 2021-01-12 PROCEDURE — 84443 ASSAY THYROID STIM HORMONE: CPT

## 2021-01-12 PROCEDURE — 80053 COMPREHEN METABOLIC PANEL: CPT

## 2021-01-12 PROCEDURE — 85027 COMPLETE CBC AUTOMATED: CPT

## 2021-01-12 NOTE — PROGRESS NOTES
Patient presents for lab draw ordered by Omid Quintanilla NP  Ordering Department/Practice:  [facility]  Date Ordered:  12-     The following labs were drawn and sent to [facility]     CBC, Lipid Profile, CMP, TSH, 3rd Generation and HgA1C    The following tubes were sent:    Gold  ( 1) and Lavender  ( 1)    Draw site:  LAC  Pain Level:0  Needle Gauge23  Aseptic technique used  Blood thinners:n  Band-Aid applied  Draw fee added   Procedure tolerated well, patient voiced no complaints.

## 2021-01-21 ENCOUNTER — TELEPHONE (OUTPATIENT)
Dept: FAMILY MEDICINE CLINIC | Age: 41
End: 2021-01-21

## 2021-01-21 ENCOUNTER — VIRTUAL VISIT (OUTPATIENT)
Dept: FAMILY MEDICINE CLINIC | Age: 41
End: 2021-01-21
Payer: MEDICAID

## 2021-01-21 DIAGNOSIS — G89.29 CHRONIC LOW BACK PAIN WITH SCIATICA, SCIATICA LATERALITY UNSPECIFIED, UNSPECIFIED BACK PAIN LATERALITY: ICD-10-CM

## 2021-01-21 DIAGNOSIS — M54.40 CHRONIC LOW BACK PAIN WITH SCIATICA, SCIATICA LATERALITY UNSPECIFIED, UNSPECIFIED BACK PAIN LATERALITY: ICD-10-CM

## 2021-01-21 DIAGNOSIS — F41.9 ANXIETY AND DEPRESSION: ICD-10-CM

## 2021-01-21 DIAGNOSIS — F32.A ANXIETY AND DEPRESSION: ICD-10-CM

## 2021-01-21 DIAGNOSIS — R20.2 NUMBNESS AND TINGLING OF BOTH UPPER EXTREMITIES: ICD-10-CM

## 2021-01-21 DIAGNOSIS — E78.00 ELEVATED CHOLESTEROL: ICD-10-CM

## 2021-01-21 DIAGNOSIS — R20.0 NUMBNESS AND TINGLING OF BOTH UPPER EXTREMITIES: ICD-10-CM

## 2021-01-21 PROCEDURE — 99213 OFFICE O/P EST LOW 20 MIN: CPT | Performed by: NURSE PRACTITIONER

## 2021-01-21 RX ORDER — ESCITALOPRAM OXALATE 10 MG/1
10 TABLET ORAL DAILY
Qty: 90 TAB | Refills: 1 | Status: SHIPPED | OUTPATIENT
Start: 2021-01-21 | End: 2021-04-01

## 2021-01-21 RX ORDER — ATORVASTATIN CALCIUM 40 MG/1
40 TABLET, FILM COATED ORAL DAILY
Qty: 90 TAB | Refills: 0 | Status: SHIPPED | OUTPATIENT
Start: 2021-01-21 | End: 2021-04-28 | Stop reason: SDUPTHER

## 2021-01-21 RX ORDER — DICLOFENAC SODIUM 10 MG/G
2 GEL TOPICAL 4 TIMES DAILY
Qty: 100 G | Refills: 0 | Status: SHIPPED | OUTPATIENT
Start: 2021-01-21

## 2021-01-21 NOTE — PROGRESS NOTES
Chief Complaint   Patient presents with    Anxiety     and depression     Results     discuss lab and x-ray results      1. Have you been to the ER, urgent care clinic since your last visit? Hospitalized since your last visit? No    2. Have you seen or consulted any other health care providers outside of the [address] [place] [address] since your last visit? Include any pap smears or colon screening.  No

## 2021-01-21 NOTE — PROGRESS NOTES
James Ceja is a 36 y.o. male who was seen by synchronous (real-time) audio-video technology on 2021 for Anxiety (and depression ) and Results (discuss lab and x-ray results )        Assessment & Plan:   Diagnoses and all orders for this visit:    1. Anxiety and depression  -     escitalopram oxalate (Lexapro) 10 mg tablet; Take 1 Tab by mouth daily. 2. Elevated cholesterol  -     atorvastatin (LIPITOR) 40 mg tablet; Take 1 Tab by mouth daily.  -     LIPID PANEL; Future    3. Chronic low back pain with sciatica, sciatica laterality unspecified, unspecified back pain laterality  -     diclofenac (VOLTAREN) 1 % gel; Apply 2 g to affected area four (4) times daily. 4. Numbness and tingling of both upper extremities  -     REFERRAL TO NEUROLOGY        I spent at least 20 minutes on this visit with this established patient. 712  Subjective: Anxiety and depression  Currently on Lexapro 10 mg daily. Patient states he is doing well on this medication and dosage will continue. Elevated cholesterol levels  Patient states he normally just eat one big meal a day. He mainly eat chicken and sea food. He   States he thought he ate ok. Educated patient on his levels and foods he can eat to help decrease. Patient states his dad  from a heart attack and stroke. He states cholesterol elevation is high on his fathers side. Chronic back pain/Tingling and numbness arms   Patient have chronic bilateral thoracic back pain his previous PCP Dr. Liudmila Barbosa had a xray completed and was informed patient had some arthritis and DJD. He uses the Voltaren for his back and it helps a great deal.  Dr. Liudmila Barbosa was aware of chronic back pain and numbness and tingling in the arms. He was getting ready to get sent for nerve testing but due to insurance had to stop. Will refer to neurology.   Patient states the numbness start up at the shoulder blade and neck on left and right side and numbness and tingling will travel down to his fingertips. Patient denies injury or anything to precipitate the symptoms. Lab work discussed today. Placed order to recheck labs in 3 months. Prior to Admission medications    Medication Sig Start Date End Date Taking? Authorizing Provider   diclofenac (VOLTAREN) 1 % gel Apply  to affected area four (4) times daily. Yes Provider, Historical   escitalopram oxalate (Lexapro) 10 mg tablet Take 1 Tab by mouth daily.  12/24/20  Yes Dean Roper November, NP     Patient Active Problem List   Diagnosis Code    Alcohol use disorder, severe, dependence ([facility] [address] F10.20    Alcohol withdrawal, uncomplicated ([facility] D28.891    Cannabis use disorder, mild, abuse F12.10    Tobacco use disorder, moderate, dependence F17.200    Depressive disorder F32.9    Alcohol withdrawal ([facility] F10.239    Primary insomnia F51.01    Anxiety F41.9    Cervicalgia M54.2    Chronic bilateral thoracic back pain M54.6, G89.29    Elevated LFTs R79.89     Patient Active Problem List    Diagnosis Date Noted    Alcohol withdrawal, uncomplicated ([facility] 44/29/3752     Priority: 1 - One    Depressive disorder 05/05/2018     Priority: 1 - One    Cannabis use disorder, mild, abuse 05/05/2018     Priority: 3 - Three    Tobacco use disorder, moderate, dependence 05/05/2018     Priority: 4 - Four    Primary insomnia 12/07/2020    Anxiety 12/07/2020    Cervicalgia 12/07/2020    Chronic bilateral thoracic back pain 12/07/2020    Elevated LFTs 12/07/2020    Alcohol withdrawal ([facility] [address] [address] 10/30/2020    Alcohol use disorder, severe, dependence ([facility] [address] 05/04/2018     No Known Allergies  Family History   Problem Relation Age of Onset    Alcohol abuse Mother     Heart Disease Father     Alcohol abuse Father     Alcohol abuse Sister     Diabetes Brother      Social History     Tobacco Use    Smoking status: Never Smoker    Smokeless tobacco: Current User    Tobacco comment: chew tobacco   Substance Use Topics    Alcohol use: Not Currently       Review of Systems   Constitutional: Negative for chills, fever and malaise/fatigue. Eyes: Negative. Respiratory: Negative for cough, shortness of breath and wheezing. Cardiovascular: Negative for chest pain, palpitations and leg swelling. Neurological: Negative. Psychiatric/Behavioral: Negative for depression. The patient is not nervous/anxious. Objective:     Patient-Reported Vitals 12/24/2020   Patient-Reported Weight 160      General: alert, cooperative, no distress   Mental  status: normal mood, behavior, speech, dress, motor activity, and thought processes, able to follow commands   HENT: NCAT   Neck: no visualized mass   Resp: no respiratory distress   Neuro: no gross deficits   Skin: no discoloration or lesions of concern on visible areas   Psychiatric: normal affect, consistent with stated mood, no evidence of hallucinations     Additional exam findings: We discussed the expected course, resolution and complications of the diagnosis(es) in detail. Medication risks, benefits, costs, interactions, and alternatives were discussed as indicated. I advised him to contact the office if his condition worsens, changes or fails to improve as anticipated. He expressed understanding with the diagnosis(es) and plan. Lincoln Short, who was evaluated through a patient-initiated, synchronous (real-time) audio-video encounter, and/or his healthcare decision maker, is aware that it is a billable service, with coverage as determined by his insurance carrier. He provided verbal consent to proceed: Yes, and patient identification was verified. It was conducted pursuant to the emergency declaration under the [facility], [address] [place] [address] authority and the [employer] Act. A caregiver was present when appropriate. Ability to conduct physical exam was limited. I was in the office.  The patient was at home.       Malik Alva NP

## 2021-04-01 DIAGNOSIS — F41.9 ANXIETY AND DEPRESSION: ICD-10-CM

## 2021-04-01 DIAGNOSIS — F32.A ANXIETY AND DEPRESSION: ICD-10-CM

## 2021-04-01 RX ORDER — ESCITALOPRAM OXALATE 10 MG/1
TABLET ORAL
Qty: 90 TAB | Refills: 0 | Status: SHIPPED | OUTPATIENT
Start: 2021-04-01 | End: 2021-04-05

## 2021-04-05 DIAGNOSIS — F32.A ANXIETY AND DEPRESSION: ICD-10-CM

## 2021-04-05 DIAGNOSIS — F41.9 ANXIETY AND DEPRESSION: ICD-10-CM

## 2021-04-05 RX ORDER — ESCITALOPRAM OXALATE 10 MG/1
TABLET ORAL
Qty: 90 TAB | Refills: 0 | Status: SHIPPED | OUTPATIENT
Start: 2021-04-05 | End: 2021-04-28

## 2021-04-27 NOTE — PROGRESS NOTES
OFFICE NOTE    Cher Beth is a 36 y.o. male presenting today for the following:  Chief Complaint   Patient presents with    Labs    Tingling     Left arm x ongoing and getting worse      HPI     Anxiety and depression  Patient is currently taking Lexapro 10 mg daily. Denies side effects and take as prescribed. He states he is doing well on this medication and would like to continue using it. No signs of SI or wanting to hurt others. Denies side effects to this medication and take as prescribed. Elevated Cholesterol  Patient is taking Lipitor 40 mg daily. Denies side effects and take as prescribed. Patient will continue taking this medication. He states he already follows a healthy lifestyle and unsure why is cholesterol is elevated. The goal is to stop the cholesterol medication. Numbness and Tingling/DDD  Chronic - Patient have numbness and tingling that start in the left shoulder down arm to hand. The numbness takes more affect in the hand. He states this also happens on the right side as well but more so on the left side. This has been happening over a year intermittently but now symptoms are happening more. He states when he sleep if his arms are up the symptoms will start. He states if he sits at a certain ankle something pinches in the upper cervical area and send tingling down to the mid to lower back. He denies any numbness and tingling down his buttocks and legs. He states he was seen a neurologist once 6 years ago and was schedule to get nerve testing and lost his medical insurance so he was unable to get testing done. Patient states he have had multiple care accidents in the past that he thinks may have something to do with his symptoms. Will refer to neurology. Review of Systems   Constitutional: Negative for fatigue and fever. HENT: Negative. Eyes: Negative. Respiratory: Negative for cough, chest tightness, shortness of breath and wheezing. Cardiovascular: Negative for chest pain and palpitations. Neurological: Negative for dizziness, light-headedness and headaches.          History  Past Medical History:   Diagnosis Date    Alcohol use disorder, severe, dependence ([facility] 5/4/2018    Alcohol withdrawal, uncomplicated ([facility] 6/3/5067    Cannabis use disorder, mild, abuse 5/5/2018    Depressive episode 5/5/2018    Hypercholesterolemia     Psychotic disorder ([facility]     Tobacco use disorder, moderate, dependence 5/5/2018       Past Surgical History:   Procedure Laterality Date    HX CHOLECYSTECTOMY         Social History     Socioeconomic History    Marital status: SINGLE     Spouse name: Not on file    Number of children: 0    Years of education: Not on file    Highest education level: Not on file   Occupational History    Not on file   Social Needs    Financial resource strain: Patient refused    Food insecurity     Worry: Patient refused     Inability: Patient refused    Transportation needs     Medical: Patient refused     Non-medical: Patient refused   Tobacco Use    Smoking status: Never Smoker    Smokeless tobacco: Current User     Types: Chew    Tobacco comment: chew tobacco   Substance and Sexual Activity    Alcohol use: Not Currently    Drug use: Not Currently     Types: Marijuana    Sexual activity: Not Currently   Lifestyle    Physical activity     Days per week: Patient refused     Minutes per session: Patient refused    Stress: Patient refused   Relationships    Social connections     Talks on phone: Patient refused     Gets together: Patient refused     Attends Congregational service: Patient refused     Active member of club or organization: Patient refused     Attends meetings of clubs or organizations: Patient refused     Relationship status: Patient refused    Intimate partner violence     Fear of current or ex partner: Patient refused     Emotionally abused: Patient refused     Physically abused: Patient refused Forced sexual activity: Patient refused   Other Topics Concern     Service Not Asked    Blood Transfusions Not Asked    Caffeine Concern Not Asked    Occupational Exposure Not Asked    Hobby Hazards Not Asked    Sleep Concern Not Asked    Stress Concern Not Asked    Weight Concern Not Asked    Special Diet Not Asked    Back Care Not Asked    Exercise Not Asked    Bike Helmet Not Asked   [address] Not Asked    Self-Exams Not Asked   Social History Narrative    Not on file       No Known Allergies    Current Outpatient Medications   Medication Sig Dispense Refill    atorvastatin (LIPITOR) 40 mg tablet Take 1 Tab by mouth daily. 90 Tab 1    escitalopram oxalate (LEXAPRO) 10 mg tablet Take 1 tablet by mouth once daily 90 Tab 1    predniSONE (DELTASONE) 20 mg tablet Take 3 tablets daily by mouth for 3 days, then take 2 tablets daily by mouth for 3 days, then take 1 tablet daily by mouth for 3 days. 18 Tab 0    diclofenac (VOLTAREN) 1 % gel Apply 2 g to affected area four (4) times daily. 100 g 0         Patient Care Team:  Patient Care Team:  Sebastian Owens NP as PCP - General (Nurse Practitioner)  Sebastian Owens NP as PCP - [address] [place] [address] Dr Sánchez Provider      LABS:  No results found for any visits on 04/28/21. RADIOLOGY:  No recent results      Physical Exam  Vitals signs and nursing note reviewed. Constitutional:       Appearance: He is well-developed. Eyes:      Pupils: Pupils are equal, round, and reactive to light. Neck:      Musculoskeletal: Normal range of motion and neck supple. Cardiovascular:      Rate and Rhythm: Normal rate and regular rhythm. Heart sounds: Normal heart sounds. Pulmonary:      Effort: Pulmonary effort is normal. No respiratory distress. Breath sounds: Normal breath sounds. No wheezing or rales. Musculoskeletal: Normal range of motion. Lymphadenopathy:      Cervical: No cervical adenopathy.    Skin:     General: Skin is warm and dry.   Neurological:      Mental Status: He is alert and oriented to person, place, and time. Deep Tendon Reflexes: Reflexes are normal and symmetric. Psychiatric:         Mood and Affect: Mood normal.           Vitals:    04/28/21 0820   BP: 129/85   Pulse: 74   Resp: 20   Temp: 98 °F (36.7 °C)   TempSrc: Oral   SpO2: 98%   Weight: 180 lb (81.6 kg)   Height: 6' (1.829 m)   PainSc:   0 - No pain         Assessment and Plan    1. Elevated cholesterol    - atorvastatin (LIPITOR) 40 mg tablet; Take 1 Tab by mouth daily. Dispense: 90 Tab; Refill: 1    2. Encounter for immunization    - PNEUMOCOCCAL POLYSACCHARIDE VACCINE, 23-VALENT, ADULT OR IMMUNOSUPPRESSED PT DOSE,  - TETANUS, DIPHTHERIA TOXOIDS AND ACELLULAR PERTUSSIS VACCINE (TDAP), IN INDIVIDS. >=7, IM    3. Numbness and tingling    - REFERRAL TO NEUROLOGY  - predniSONE (DELTASONE) 20 mg tablet; Take 3 tablets daily by mouth for 3 days, then take 2 tablets daily by mouth for 3 days, then take 1 tablet daily by mouth for 3 days. Dispense: 18 Tab; Refill: 0    4. Anxiety and depression    - escitalopram oxalate (LEXAPRO) 10 mg tablet; Take 1 tablet by mouth once daily  Dispense: 90 Tab; Refill: 1      MDM    Procedures      *Plan of care reviewed with patient. Patient in agreement with plan and expresses understanding. All questions answered and patient encouraged to call or RTO if further questions or concerns. Advised patient if symptoms worsen to go to nearest ER or call 911. AVS and recommendations given to patient upon discharge.

## 2021-04-28 ENCOUNTER — HOSPITAL ENCOUNTER (OUTPATIENT)
Dept: LAB | Age: 41
Discharge: HOME | End: 2021-04-28
Payer: MEDICAID

## 2021-04-28 ENCOUNTER — OFFICE VISIT (OUTPATIENT)
Dept: FAMILY MEDICINE CLINIC | Age: 41
End: 2021-04-28
Payer: MEDICAID

## 2021-04-28 VITALS
SYSTOLIC BLOOD PRESSURE: 129 MMHG | WEIGHT: 180 LBS | TEMPERATURE: 98 F | OXYGEN SATURATION: 98 % | RESPIRATION RATE: 20 BRPM | HEART RATE: 74 BPM | HEIGHT: 72 IN | DIASTOLIC BLOOD PRESSURE: 85 MMHG | BODY MASS INDEX: 24.38 KG/M2

## 2021-04-28 DIAGNOSIS — F32.A ANXIETY AND DEPRESSION: ICD-10-CM

## 2021-04-28 DIAGNOSIS — R20.2 NUMBNESS AND TINGLING: ICD-10-CM

## 2021-04-28 DIAGNOSIS — E78.00 ELEVATED CHOLESTEROL: ICD-10-CM

## 2021-04-28 DIAGNOSIS — F41.9 ANXIETY AND DEPRESSION: ICD-10-CM

## 2021-04-28 DIAGNOSIS — Z23 ENCOUNTER FOR IMMUNIZATION: Primary | ICD-10-CM

## 2021-04-28 DIAGNOSIS — R20.0 NUMBNESS AND TINGLING: ICD-10-CM

## 2021-04-28 LAB
CHOLEST SERPL-MCNC: 163 MG/DL
HDLC SERPL-MCNC: 39 MG/DL (ref 40–60)
HDLC SERPL: 4.2 {RATIO} (ref 0–5)
LDLC SERPL CALC-MCNC: 96.6 MG/DL (ref 0–100)
LIPID PROFILE,FLP: ABNORMAL
TRIGL SERPL-MCNC: 137 MG/DL (ref ?–150)
VLDLC SERPL CALC-MCNC: 27.4 MG/DL

## 2021-04-28 PROCEDURE — 80061 LIPID PANEL: CPT

## 2021-04-28 PROCEDURE — 90715 TDAP VACCINE 7 YRS/> IM: CPT | Performed by: NURSE PRACTITIONER

## 2021-04-28 PROCEDURE — 99214 OFFICE O/P EST MOD 30 MIN: CPT | Performed by: NURSE PRACTITIONER

## 2021-04-28 PROCEDURE — 36415 COLL VENOUS BLD VENIPUNCTURE: CPT | Performed by: NURSE PRACTITIONER

## 2021-04-28 PROCEDURE — 90732 PPSV23 VACC 2 YRS+ SUBQ/IM: CPT | Performed by: NURSE PRACTITIONER

## 2021-04-28 PROCEDURE — 90471 IMMUNIZATION ADMIN: CPT | Performed by: NURSE PRACTITIONER

## 2021-04-28 PROCEDURE — 90472 IMMUNIZATION ADMIN EACH ADD: CPT | Performed by: NURSE PRACTITIONER

## 2021-04-28 RX ORDER — ESCITALOPRAM OXALATE 10 MG/1
TABLET ORAL
Qty: 90 TAB | Refills: 1 | Status: SHIPPED | OUTPATIENT
Start: 2021-04-28

## 2021-04-28 RX ORDER — ATORVASTATIN CALCIUM 40 MG/1
40 TABLET, FILM COATED ORAL DAILY
Qty: 90 TAB | Refills: 1 | Status: SHIPPED | OUTPATIENT
Start: 2021-04-28

## 2021-04-28 RX ORDER — PREDNISONE 20 MG/1
TABLET ORAL
Qty: 18 TAB | Refills: 0 | Status: SHIPPED | OUTPATIENT
Start: 2021-04-28

## 2021-04-28 NOTE — PROGRESS NOTES
Health issues addressed patient is aware and will consider the pneumonia vaccine and tetanus while he is in office

## 2021-04-28 NOTE — PROGRESS NOTES
Elizabeth Long presents today for   Chief Complaint   Patient presents with   [facility] Tingling     Left arm x ongoing and getting worse       Is someone accompanying this pt? no    Is the patient using any DME equipment during OV? no    Depression Screening:  3 most recent PHQ Screens 4/28/2021   Little interest or pleasure in doing things Not at all   Feeling down, depressed, irritable, or hopeless Not at all   Total Score PHQ 2 0   Trouble falling or staying asleep, or sleeping too much Not at all   Feeling tired or having little energy Not at all   Poor appetite, weight loss, or overeating Not at all   Feeling bad about yourself - or that you are a failure or have let yourself or your family down Not at all   Trouble concentrating on things such as school, work, reading, or watching TV Not at all   Moving or speaking so slowly that other people could have noticed; or the opposite being so fidgety that others notice Not at all   Thoughts of being better off dead, or hurting yourself in some way Not at all   PHQ 9 Score 0   How difficult have these problems made it for you to do your work, take care of your home and get along with others Not difficult at all       Learning Assessment:  No flowsheet data found. Abuse Screening:  No flowsheet data found. Fall Risk  No flowsheet data found. Health Maintenance reviewed and discussed and ordered per Provider. Health Maintenance Due   Topic Date Due    Pneumococcal 0-64 years (1 of 1 - PPSV23) Never done    COVID-19 Vaccine (1) Never done    DTaP/Tdap/Td series (1 - Tdap) Never done   . Coordination of Care:  1. Have you been to the ER, urgent care clinic since your last visit? Hospitalized since your last visit? no    2. Have you seen or consulted any other health care providers outside of the [address] [place] [address] since your last visit? Include any pap smears or colon screening.  no      Last  Checked no  Last UDS Checked no  Last Pain contract signed: no

## 2021-04-28 NOTE — PROGRESS NOTES
Patient came into lab for immunizations. Patient received TDAP and pneumonia. Patient lab ordered by PCP. Patient used left and right deltoid. Patient tolerated well. Patient didn't have any questions or concerns.

## 2022-03-18 PROBLEM — F10.939 ALCOHOL WITHDRAWAL (HCC): Status: ACTIVE | Noted: 2020-10-30

## 2022-03-18 PROBLEM — F10.20 ALCOHOL USE DISORDER, SEVERE, DEPENDENCE (HCC): Status: ACTIVE | Noted: 2018-05-04

## 2022-03-18 PROBLEM — F17.200 TOBACCO USE DISORDER, MODERATE, DEPENDENCE: Status: ACTIVE | Noted: 2018-05-05

## 2022-03-18 PROBLEM — F10.930 ALCOHOL WITHDRAWAL, UNCOMPLICATED (HCC): Status: ACTIVE | Noted: 2018-05-05

## 2022-03-19 PROBLEM — M54.6 CHRONIC BILATERAL THORACIC BACK PAIN: Status: ACTIVE | Noted: 2020-12-07

## 2022-03-19 PROBLEM — G89.29 CHRONIC BILATERAL THORACIC BACK PAIN: Status: ACTIVE | Noted: 2020-12-07

## 2022-03-19 PROBLEM — M54.2 CERVICALGIA: Status: ACTIVE | Noted: 2020-12-07

## 2022-03-19 PROBLEM — R79.89 ELEVATED LFTS: Status: ACTIVE | Noted: 2020-12-07

## 2022-03-19 PROBLEM — F32.A DEPRESSIVE DISORDER: Status: ACTIVE | Noted: 2018-05-05

## 2022-03-20 PROBLEM — F51.01 PRIMARY INSOMNIA: Status: ACTIVE | Noted: 2020-12-07

## 2022-03-20 PROBLEM — F12.10 CANNABIS USE DISORDER, MILD, ABUSE: Status: ACTIVE | Noted: 2018-05-05

## 2022-03-20 PROBLEM — F41.9 ANXIETY: Status: ACTIVE | Noted: 2020-12-07

## 2023-08-03 NOTE — ED NOTES
Patient admits to last alcoholic drink approximately 1 hour ago. Odomzo Counseling- I discussed with the patient the risks of Odomzo including but not limited to nausea, vomiting, diarrhea, constipation, weight loss, changes in the sense of taste, decreased appetite, muscle spasms, and hair loss.  The patient verbalized understanding of the proper use and possible adverse effects of Odomzo.  All of the patient's questions and concerns were addressed.

## 2024-04-25 ENCOUNTER — HOSPITAL ENCOUNTER (OUTPATIENT)
Facility: HOSPITAL | Age: 44
Discharge: HOME | End: 2024-04-28

## 2024-04-25 DIAGNOSIS — A15.9 TUBERCULOSIS: ICD-10-CM

## 2024-05-20 ENCOUNTER — HOSPITAL ENCOUNTER (EMERGENCY)
Age: 44
Discharge: HOME | End: 2024-05-20
Attending: EMERGENCY MEDICINE
Payer: MEDICAID

## 2024-05-20 ENCOUNTER — APPOINTMENT (OUTPATIENT)
Age: 44
End: 2024-05-20
Payer: MEDICAID

## 2024-05-20 VITALS
DIASTOLIC BLOOD PRESSURE: 93 MMHG | SYSTOLIC BLOOD PRESSURE: 124 MMHG | WEIGHT: 160 LBS | OXYGEN SATURATION: 99 % | BODY MASS INDEX: 21.67 KG/M2 | TEMPERATURE: 98 F | HEART RATE: 92 BPM | HEIGHT: 72 IN | RESPIRATION RATE: 17 BRPM

## 2024-05-20 DIAGNOSIS — S00.83XA FOREHEAD CONTUSION, INITIAL ENCOUNTER: Primary | ICD-10-CM

## 2024-05-20 DIAGNOSIS — S01.112A EYEBROW LACERATION, LEFT, INITIAL ENCOUNTER: ICD-10-CM

## 2024-05-20 DIAGNOSIS — S29.011A STRAIN OF RIGHT PECTORALIS MUSCLE, INITIAL ENCOUNTER: ICD-10-CM

## 2024-05-20 PROCEDURE — 71046 X-RAY EXAM CHEST 2 VIEWS: CPT

## 2024-05-20 PROCEDURE — 99283 EMERGENCY DEPT VISIT LOW MDM: CPT

## 2024-05-20 RX ORDER — IBUPROFEN 600 MG/1
600 TABLET ORAL EVERY 6 HOURS PRN
Qty: 16 TABLET | Refills: 0 | Status: SHIPPED | OUTPATIENT
Start: 2024-05-20 | End: 2024-06-05

## 2024-05-20 ASSESSMENT — PAIN - FUNCTIONAL ASSESSMENT: PAIN_FUNCTIONAL_ASSESSMENT: 0-10

## 2024-05-20 ASSESSMENT — PAIN DESCRIPTION - LOCATION: LOCATION: ARM;CHEST

## 2024-05-20 ASSESSMENT — LIFESTYLE VARIABLES
HOW MANY STANDARD DRINKS CONTAINING ALCOHOL DO YOU HAVE ON A TYPICAL DAY: 5 OR 6
HOW OFTEN DO YOU HAVE A DRINK CONTAINING ALCOHOL: 2-3 TIMES A WEEK

## 2024-05-20 ASSESSMENT — PAIN SCALES - GENERAL: PAINLEVEL_OUTOF10: 8

## 2024-05-20 ASSESSMENT — PAIN DESCRIPTION - PAIN TYPE: TYPE: ACUTE PAIN

## 2024-05-20 NOTE — DISCHARGE INSTRUCTIONS
Take the ibuprofen every 6 hours as needed for discomfort.  Moist heat to be applied to the right side of the chest with range of motion exercises.  Activities as tolerated.  Follow-up with primary care in the coming week as needed.  Return for any significant shortness of breath, if coughing up blood, if having weakness, numbness or radicular symptoms, or if concerned

## 2024-05-20 NOTE — ED PROVIDER NOTES
[facility] EMERGENCY DEPT  eMERGENCY dEPARTMENT eNCOUnter        Pt Name: Shamar Weber  MRN: [id number]  Birthdate 1980  Date of evaluation: 5/20/2024  Provider: Leno Salguero MD  PCP: Denia العلي APRN - CNP  Note Started: 1:01 PM EDT 5/20/2024          CHIEF COMPLAINT       Chief Complaint   Patient presents with    Fall       HISTORY OF PRESENT ILLNESS        Patient was helping to move a cabinet at work yesterday.  The cat it was coming down off a truck when the other individual lost his  on the cabinet.  He fell onto the patient, and struck him in the right side of the forehead and right anterior chest.  He did fall onto his right side.  No loss of consciousness.  No anticoagulants.  No neck pain, weakness, numbness or radicular symptoms.  Complains primarily of anterior right-sided chest discomfort, much worse when he uses the muscles of the chest wall.  No cough, hemoptysis or shortness of breath.  No abdominal injuries.    Nursing Notes were all reviewed and agreed with or any disagreements were addressed  in the HPI.    REVIEW OF SYSTEMS         The following 10 systems are reviewed and negative except as noted in the HPI: Constitutional, Eyes, ENT, cardiovascular, pulmonary, GI, , neuro, skin, and musculoskeletal       PASTMEDICAL HISTORY     Past Medical History:   Diagnosis Date    Alcohol use disorder, severe, dependence ([facility]) 5/4/2018    Alcohol withdrawal, uncomplicated ([facility]) 5/5/2018    Cannabis use disorder, mild, abuse 5/5/2018    Depressive episode 5/5/2018    Hypercholesterolemia     Psychotic disorder (HCC)     Tobacco use disorder, moderate, dependence 5/5/2018         SURGICAL HISTORY       Past Surgical History:   Procedure Laterality Date    CHOLECYSTECTOMY           CURRENT MEDICATIONS       Previous Medications    No medications on file       ALLERGIES     Patient has no known allergies.    FAMILY HISTORY       Family History   Problem Relation Age of Onset

## 2024-05-20 NOTE — ED TRIAGE NOTES
Pt reports moving some furniture yesterday when a cabinet fell on him, hitting his head and pulling some muscles in his arm and chest.

## 2024-10-22 ENCOUNTER — HOSPITAL ENCOUNTER (OUTPATIENT)
Age: 44
Setting detail: OBSERVATION
Discharge: HOME | End: 2024-10-23
Attending: EMERGENCY MEDICINE | Admitting: FAMILY MEDICINE
Payer: MEDICAID

## 2024-10-22 DIAGNOSIS — F10.20 UNCOMPLICATED ALCOHOL DEPENDENCE (HCC): Primary | ICD-10-CM

## 2024-10-22 PROBLEM — F10.939 ALCOHOL WITHDRAWAL, WITH UNSPECIFIED COMPLICATION (HCC): Status: ACTIVE | Noted: 2024-10-22

## 2024-10-22 LAB
ALBUMIN SERPL-MCNC: 4.5 G/DL (ref 3.4–5)
ALBUMIN/GLOB SERPL: 1.3 (ref 0.8–1.7)
ALP SERPL-CCNC: 119 U/L (ref 45–117)
ALT SERPL-CCNC: 183 U/L (ref 16–61)
ANION GAP SERPL CALC-SCNC: 14 MMOL/L (ref 3–18)
APAP SERPL-MCNC: <2 UG/ML (ref 10–30)
AST SERPL W P-5'-P-CCNC: 227 U/L (ref 10–38)
BASOPHILS # BLD: 0.1 K/UL (ref 0–0.1)
BASOPHILS NFR BLD: 1 % (ref 0–2)
BILIRUB SERPL-MCNC: 1.3 MG/DL (ref 0.2–1)
BUN SERPL-MCNC: 7 MG/DL (ref 7–18)
BUN/CREAT SERPL: 9 (ref 12–20)
CA-I BLD-MCNC: 8.8 MG/DL (ref 8.5–10.1)
CHLORIDE SERPL-SCNC: 103 MMOL/L (ref 100–111)
CO2 SERPL-SCNC: 26 MMOL/L (ref 21–32)
CREAT SERPL-MCNC: 0.8 MG/DL (ref 0.6–1.3)
DIFFERENTIAL METHOD BLD: ABNORMAL
EOSINOPHIL # BLD: 0.1 K/UL (ref 0–0.4)
EOSINOPHIL NFR BLD: 2 % (ref 0–5)
ERYTHROCYTE [DISTWIDTH] IN BLOOD BY AUTOMATED COUNT: 11.8 % (ref 11.6–14.5)
ETHANOL SERPL-MCNC: 450 MG/DL (ref 0–3)
FLUAV RNA SPEC QL NAA+PROBE: NOT DETECTED
FLUBV RNA SPEC QL NAA+PROBE: NOT DETECTED
GLOBULIN SER CALC-MCNC: 3.5 G/DL (ref 2–4)
GLUCOSE SERPL-MCNC: 106 MG/DL (ref 74–99)
HCT VFR BLD AUTO: 45.6 % (ref 36–48)
HGB BLD-MCNC: 16.5 G/DL (ref 13–16)
IMM GRANULOCYTES # BLD AUTO: 0 K/UL (ref 0–0.04)
IMM GRANULOCYTES NFR BLD AUTO: 1 % (ref 0–0.5)
LYMPHOCYTES # BLD: 1.9 K/UL (ref 0.9–3.6)
LYMPHOCYTES NFR BLD: 34 % (ref 21–52)
MAGNESIUM SERPL-MCNC: 1.9 MG/DL (ref 1.6–2.6)
MCH RBC QN AUTO: 35.9 PG (ref 24–34)
MCHC RBC AUTO-ENTMCNC: 36.2 G/DL (ref 31–37)
MCV RBC AUTO: 99.1 FL (ref 78–100)
MONOCYTES # BLD: 0.5 K/UL (ref 0.05–1.2)
MONOCYTES NFR BLD: 10 % (ref 3–10)
NEUTS SEG # BLD: 2.9 K/UL (ref 1.8–8)
NEUTS SEG NFR BLD: 52 % (ref 40–73)
NRBC # BLD: 0 K/UL (ref 0–0.01)
NRBC BLD-RTO: 0 PER 100 WBC
PLATELET # BLD AUTO: 254 K/UL (ref 135–420)
PMV BLD AUTO: 9 FL (ref 9.2–11.8)
POTASSIUM SERPL-SCNC: 3.5 MMOL/L (ref 3.5–5.5)
PROT SERPL-MCNC: 8 G/DL (ref 6.4–8.2)
RBC # BLD AUTO: 4.6 M/UL (ref 4.35–5.65)
SARS-COV-2 RNA RESP QL NAA+PROBE: NOT DETECTED
SODIUM SERPL-SCNC: 143 MMOL/L (ref 136–145)
WBC # BLD AUTO: 5.5 K/UL (ref 4.6–13.2)

## 2024-10-22 PROCEDURE — 6370000000 HC RX 637 (ALT 250 FOR IP): Performed by: NURSE PRACTITIONER

## 2024-10-22 PROCEDURE — 2580000003 HC RX 258: Performed by: NURSE PRACTITIONER

## 2024-10-22 PROCEDURE — 87636 SARSCOV2 & INF A&B AMP PRB: CPT

## 2024-10-22 PROCEDURE — 96360 HYDRATION IV INFUSION INIT: CPT

## 2024-10-22 PROCEDURE — 80143 DRUG ASSAY ACETAMINOPHEN: CPT

## 2024-10-22 PROCEDURE — 99285 EMERGENCY DEPT VISIT HI MDM: CPT

## 2024-10-22 PROCEDURE — 2580000003 HC RX 258: Performed by: EMERGENCY MEDICINE

## 2024-10-22 PROCEDURE — 85025 COMPLETE CBC W/AUTO DIFF WBC: CPT

## 2024-10-22 PROCEDURE — 80053 COMPREHEN METABOLIC PANEL: CPT

## 2024-10-22 PROCEDURE — 6370000000 HC RX 637 (ALT 250 FOR IP): Performed by: EMERGENCY MEDICINE

## 2024-10-22 PROCEDURE — G0378 HOSPITAL OBSERVATION PER HR: HCPCS

## 2024-10-22 PROCEDURE — 82077 ASSAY SPEC XCP UR&BREATH IA: CPT

## 2024-10-22 PROCEDURE — 80074 ACUTE HEPATITIS PANEL: CPT

## 2024-10-22 PROCEDURE — 93005 ELECTROCARDIOGRAM TRACING: CPT | Performed by: EMERGENCY MEDICINE

## 2024-10-22 PROCEDURE — 83735 ASSAY OF MAGNESIUM: CPT

## 2024-10-22 RX ORDER — LORAZEPAM 2 MG/ML
1 INJECTION INTRAMUSCULAR
Status: DISCONTINUED | OUTPATIENT
Start: 2024-10-22 | End: 2024-10-23 | Stop reason: HOSPADM

## 2024-10-22 RX ORDER — LORAZEPAM 1 MG/1
2 TABLET ORAL
Status: DISCONTINUED | OUTPATIENT
Start: 2024-10-22 | End: 2024-10-23 | Stop reason: HOSPADM

## 2024-10-22 RX ORDER — 0.9 % SODIUM CHLORIDE 0.9 %
1000 INTRAVENOUS SOLUTION INTRAVENOUS ONCE
Status: COMPLETED | OUTPATIENT
Start: 2024-10-22 | End: 2024-10-23

## 2024-10-22 RX ORDER — CHLORDIAZEPOXIDE HYDROCHLORIDE 25 MG/1
25 CAPSULE, GELATIN COATED ORAL 3 TIMES DAILY
Status: DISCONTINUED | OUTPATIENT
Start: 2024-10-22 | End: 2024-10-23 | Stop reason: HOSPADM

## 2024-10-22 RX ORDER — LORAZEPAM 1 MG/1
3 TABLET ORAL
Status: DISCONTINUED | OUTPATIENT
Start: 2024-10-22 | End: 2024-10-23 | Stop reason: HOSPADM

## 2024-10-22 RX ORDER — NICOTINE 21 MG/24HR
1 PATCH, TRANSDERMAL 24 HOURS TRANSDERMAL DAILY
Status: DISCONTINUED | OUTPATIENT
Start: 2024-10-22 | End: 2024-10-23 | Stop reason: HOSPADM

## 2024-10-22 RX ORDER — SODIUM CHLORIDE 9 MG/ML
INJECTION, SOLUTION INTRAVENOUS PRN
Status: DISCONTINUED | OUTPATIENT
Start: 2024-10-22 | End: 2024-10-23 | Stop reason: HOSPADM

## 2024-10-22 RX ORDER — CLONIDINE HYDROCHLORIDE 0.1 MG/1
0.1 TABLET ORAL EVERY 4 HOURS PRN
Status: DISCONTINUED | OUTPATIENT
Start: 2024-10-22 | End: 2024-10-23 | Stop reason: HOSPADM

## 2024-10-22 RX ORDER — LORAZEPAM 1 MG/1
1 TABLET ORAL
Status: DISCONTINUED | OUTPATIENT
Start: 2024-10-22 | End: 2024-10-23 | Stop reason: HOSPADM

## 2024-10-22 RX ORDER — GAUZE BANDAGE 2" X 2"
100 BANDAGE TOPICAL ONCE
Status: COMPLETED | OUTPATIENT
Start: 2024-10-22 | End: 2024-10-22

## 2024-10-22 RX ORDER — MULTIVITAMIN WITH IRON
1 TABLET ORAL DAILY
Status: DISCONTINUED | OUTPATIENT
Start: 2024-10-22 | End: 2024-10-23 | Stop reason: HOSPADM

## 2024-10-22 RX ORDER — ENOXAPARIN SODIUM 100 MG/ML
40 INJECTION SUBCUTANEOUS DAILY
Status: DISCONTINUED | OUTPATIENT
Start: 2024-10-23 | End: 2024-10-23 | Stop reason: HOSPADM

## 2024-10-22 RX ORDER — SODIUM CHLORIDE 0.9 % (FLUSH) 0.9 %
5-40 SYRINGE (ML) INJECTION EVERY 12 HOURS SCHEDULED
Status: DISCONTINUED | OUTPATIENT
Start: 2024-10-22 | End: 2024-10-23 | Stop reason: HOSPADM

## 2024-10-22 RX ORDER — LORAZEPAM 1 MG/1
4 TABLET ORAL
Status: DISCONTINUED | OUTPATIENT
Start: 2024-10-22 | End: 2024-10-23 | Stop reason: HOSPADM

## 2024-10-22 RX ORDER — LORAZEPAM 2 MG/ML
2 INJECTION INTRAMUSCULAR
Status: DISCONTINUED | OUTPATIENT
Start: 2024-10-22 | End: 2024-10-23 | Stop reason: HOSPADM

## 2024-10-22 RX ORDER — SODIUM CHLORIDE 0.9 % (FLUSH) 0.9 %
5-40 SYRINGE (ML) INJECTION PRN
Status: DISCONTINUED | OUTPATIENT
Start: 2024-10-22 | End: 2024-10-23 | Stop reason: HOSPADM

## 2024-10-22 RX ORDER — FOLIC ACID 1 MG/1
1 TABLET ORAL DAILY
Status: DISCONTINUED | OUTPATIENT
Start: 2024-10-22 | End: 2024-10-23 | Stop reason: HOSPADM

## 2024-10-22 RX ORDER — LORAZEPAM 2 MG/ML
3 INJECTION INTRAMUSCULAR
Status: DISCONTINUED | OUTPATIENT
Start: 2024-10-22 | End: 2024-10-23 | Stop reason: HOSPADM

## 2024-10-22 RX ORDER — LORAZEPAM 2 MG/ML
4 INJECTION INTRAMUSCULAR
Status: DISCONTINUED | OUTPATIENT
Start: 2024-10-22 | End: 2024-10-23 | Stop reason: HOSPADM

## 2024-10-22 RX ADMIN — SODIUM CHLORIDE 1000 ML: 9 INJECTION, SOLUTION INTRAVENOUS at 22:51

## 2024-10-22 RX ADMIN — SODIUM CHLORIDE, PRESERVATIVE FREE 10 ML: 5 INJECTION INTRAVENOUS at 22:40

## 2024-10-22 RX ADMIN — LORAZEPAM 1 MG: 1 TABLET ORAL at 19:00

## 2024-10-22 RX ADMIN — THERA TABS 1 TABLET: TAB at 22:44

## 2024-10-22 RX ADMIN — Medication 100 MG: at 18:25

## 2024-10-22 RX ADMIN — CHLORDIAZEPOXIDE HYDROCHLORIDE 25 MG: 25 CAPSULE ORAL at 22:39

## 2024-10-22 RX ADMIN — FOLIC ACID 1 MG: 1 TABLET ORAL at 22:39

## 2024-10-22 ASSESSMENT — PAIN DESCRIPTION - LOCATION: LOCATION: BACK;NECK

## 2024-10-22 ASSESSMENT — PAIN SCALES - GENERAL
PAINLEVEL_OUTOF10: 0
PAINLEVEL_OUTOF10: 0
PAINLEVEL_OUTOF10: 6

## 2024-10-22 ASSESSMENT — PAIN - FUNCTIONAL ASSESSMENT: PAIN_FUNCTIONAL_ASSESSMENT: 0-10

## 2024-10-22 ASSESSMENT — PAIN DESCRIPTION - PAIN TYPE: TYPE: CHRONIC PAIN

## 2024-10-22 ASSESSMENT — LIFESTYLE VARIABLES
HOW OFTEN DO YOU HAVE A DRINK CONTAINING ALCOHOL: 4 OR MORE TIMES A WEEK
HOW MANY STANDARD DRINKS CONTAINING ALCOHOL DO YOU HAVE ON A TYPICAL DAY: 10 OR MORE

## 2024-10-22 ASSESSMENT — PAIN DESCRIPTION - DESCRIPTORS: DESCRIPTORS: DISCOMFORT

## 2024-10-22 NOTE — ED PROVIDER NOTES
[facility] ICU  eMERGENCY dEPARTMENT eNCOUnter        Pt Name: Shamar Weber  MRN: [id number]  Birthdate 1980  Date of evaluation: 10/22/2024  Provider: Leno Salguero MD  PCP: Denia العلي APRN - KIERSTEN  Note Started: 6:22 PM EDT 10/23/2024          CHIEF COMPLAINT       Chief Complaint   Patient presents with    detox       HISTORY OF PRESENT ILLNESS        Patient coming in by ambulance stating that he wants to enter alcohol rehabilitation.  States that he has been making some calls along with a friend and that he has a place to go in [place] but that he needs to \"detox\" first.  States that he drinks heavily most days.  He also smokes of marijuana.  He has chronic back pain without change.  No acute medical concerns.  Last drink was just prior to getting on the ambulance.  He reports that he drinks \"a liter\" of vodka per day    Nursing Notes were all reviewed and agreed with or any disagreements were addressed  in the HPI.    REVIEW OF SYSTEMS         The following 7 systems are reviewed and negative except as noted in the HPI: Constitutional, ENT, CV, GI, Pulmonary, , and skin       PASTMEDICAL HISTORY     Past Medical History:   Diagnosis Date    Alcohol use disorder, severe, dependence (HCC) 5/4/2018    Alcohol withdrawal, uncomplicated (HCC) 5/5/2018    Arthritis     Cannabis use disorder, mild, abuse 5/5/2018    Depressive episode 5/5/2018    Hypercholesterolemia     Psychotic disorder (HCC)     Tobacco use disorder, moderate, dependence 5/5/2018         SURGICAL HISTORY       Past Surgical History:   Procedure Laterality Date    CHOLECYSTECTOMY           CURRENT MEDICATIONS       Current Discharge Medication List        CONTINUE these medications which have NOT CHANGED    Details   ibuprofen (ADVIL;MOTRIN) 600 MG tablet Take 1 tablet by mouth every 6 hours as needed for Pain  Qty: 16 tablet, Refills: 0             ALLERGIES     Patient has no known allergies.    FAMILY HISTORY

## 2024-10-22 NOTE — ED NOTES
Patient states he has already been accepted to a detox facility in [place] ([facility]). His friend helped him get in and she will be here soon with info.

## 2024-10-22 NOTE — ED TRIAGE NOTES
Patient came to ER today for help to stop drinking alcohol. Patient's last drink was today before getting in the ambulance.

## 2024-10-23 VITALS
BODY MASS INDEX: 19.41 KG/M2 | WEIGHT: 143.33 LBS | RESPIRATION RATE: 20 BRPM | TEMPERATURE: 98.7 F | HEART RATE: 110 BPM | DIASTOLIC BLOOD PRESSURE: 86 MMHG | OXYGEN SATURATION: 95 % | HEIGHT: 72 IN | SYSTOLIC BLOOD PRESSURE: 134 MMHG

## 2024-10-23 LAB
-: NORMAL
ALBUMIN SERPL-MCNC: 3.6 G/DL (ref 3.4–5)
ALBUMIN/GLOB SERPL: 1.2 (ref 0.8–1.7)
ALP SERPL-CCNC: 95 U/L (ref 45–117)
ALT SERPL-CCNC: 144 U/L (ref 16–61)
ANION GAP SERPL CALC-SCNC: 10 MMOL/L (ref 3–18)
AST SERPL W P-5'-P-CCNC: 161 U/L (ref 10–38)
BASOPHILS # BLD: 0 K/UL (ref 0–0.1)
BASOPHILS NFR BLD: 1 % (ref 0–2)
BILIRUB SERPL-MCNC: 1.5 MG/DL (ref 0.2–1)
BUN SERPL-MCNC: 7 MG/DL (ref 7–18)
BUN/CREAT SERPL: 11 (ref 12–20)
CA-I BLD-MCNC: 8.4 MG/DL (ref 8.5–10.1)
CHLORIDE SERPL-SCNC: 103 MMOL/L (ref 100–111)
CO2 SERPL-SCNC: 28 MMOL/L (ref 21–32)
CREAT SERPL-MCNC: 0.64 MG/DL (ref 0.6–1.3)
DIFFERENTIAL METHOD BLD: ABNORMAL
EKG ATRIAL RATE: 93 BPM
EKG DIAGNOSIS: NORMAL
EKG P AXIS: 77 DEGREES
EKG P-R INTERVAL: 132 MS
EKG Q-T INTERVAL: 354 MS
EKG QRS DURATION: 104 MS
EKG QTC CALCULATION (BAZETT): 440 MS
EKG R AXIS: 75 DEGREES
EKG T AXIS: 71 DEGREES
EKG VENTRICULAR RATE: 93 BPM
EOSINOPHIL # BLD: 0 K/UL (ref 0–0.4)
EOSINOPHIL NFR BLD: 0 % (ref 0–5)
ERYTHROCYTE [DISTWIDTH] IN BLOOD BY AUTOMATED COUNT: 11.9 % (ref 11.6–14.5)
GLOBULIN SER CALC-MCNC: 2.9 G/DL (ref 2–4)
GLUCOSE SERPL-MCNC: 97 MG/DL (ref 74–99)
HAV IGM SER QL: NEGATIVE
HBV CORE IGM SER QL: NEGATIVE
HBV SURFACE AG SER QL: 0.1 INDEX
HBV SURFACE AG SER QL: NEGATIVE
HCT VFR BLD AUTO: 38.8 % (ref 36–48)
HCV AB SER IA-ACNC: 0.04 INDEX
HCV AB SERPL QL IA: NEGATIVE
HEPATITIS C COMMENT: NORMAL
HGB BLD-MCNC: 13.7 G/DL (ref 13–16)
IMM GRANULOCYTES # BLD AUTO: 0 K/UL (ref 0–0.04)
IMM GRANULOCYTES NFR BLD AUTO: 0 % (ref 0–0.5)
LYMPHOCYTES # BLD: 1.4 K/UL (ref 0.9–3.6)
LYMPHOCYTES NFR BLD: 39 % (ref 21–52)
MCH RBC QN AUTO: 35.4 PG (ref 24–34)
MCHC RBC AUTO-ENTMCNC: 35.3 G/DL (ref 31–37)
MCV RBC AUTO: 100.3 FL (ref 78–100)
MONOCYTES # BLD: 0.4 K/UL (ref 0.05–1.2)
MONOCYTES NFR BLD: 11 % (ref 3–10)
NEUTS SEG # BLD: 1.7 K/UL (ref 1.8–8)
NEUTS SEG NFR BLD: 49 % (ref 40–73)
NRBC # BLD: 0 K/UL (ref 0–0.01)
NRBC BLD-RTO: 0 PER 100 WBC
PLATELET # BLD AUTO: 194 K/UL (ref 135–420)
PMV BLD AUTO: 9.3 FL (ref 9.2–11.8)
POTASSIUM SERPL-SCNC: 3.5 MMOL/L (ref 3.5–5.5)
PROT SERPL-MCNC: 6.5 G/DL (ref 6.4–8.2)
RBC # BLD AUTO: 3.87 M/UL (ref 4.35–5.65)
SODIUM SERPL-SCNC: 141 MMOL/L (ref 136–145)
WBC # BLD AUTO: 3.6 K/UL (ref 4.6–13.2)

## 2024-10-23 PROCEDURE — 96361 HYDRATE IV INFUSION ADD-ON: CPT

## 2024-10-23 PROCEDURE — 80053 COMPREHEN METABOLIC PANEL: CPT

## 2024-10-23 PROCEDURE — 36415 COLL VENOUS BLD VENIPUNCTURE: CPT

## 2024-10-23 PROCEDURE — 85025 COMPLETE CBC W/AUTO DIFF WBC: CPT

## 2024-10-23 PROCEDURE — 6370000000 HC RX 637 (ALT 250 FOR IP): Performed by: NURSE PRACTITIONER

## 2024-10-23 PROCEDURE — 94761 N-INVAS EAR/PLS OXIMETRY MLT: CPT

## 2024-10-23 PROCEDURE — G0378 HOSPITAL OBSERVATION PER HR: HCPCS

## 2024-10-23 PROCEDURE — 6370000000 HC RX 637 (ALT 250 FOR IP): Performed by: EMERGENCY MEDICINE

## 2024-10-23 PROCEDURE — 2580000003 HC RX 258: Performed by: EMERGENCY MEDICINE

## 2024-10-23 RX ADMIN — LORAZEPAM 1 MG: 1 TABLET ORAL at 05:18

## 2024-10-23 RX ADMIN — SODIUM CHLORIDE, PRESERVATIVE FREE 10 ML: 5 INJECTION INTRAVENOUS at 10:07

## 2024-10-23 RX ADMIN — CHLORDIAZEPOXIDE HYDROCHLORIDE 25 MG: 25 CAPSULE ORAL at 10:07

## 2024-10-23 RX ADMIN — THERA TABS 1 TABLET: TAB at 10:07

## 2024-10-23 RX ADMIN — FOLIC ACID 1 MG: 1 TABLET ORAL at 10:07

## 2024-10-23 NOTE — PLAN OF CARE
Problem: Discharge Planning  Goal: Discharge to home or other facility with appropriate resources  Outcome: Progressing     Problem: Pain  Goal: Verbalizes/displays adequate comfort level or baseline comfort level  Outcome: Adequate for Discharge  Flowsheets (Taken 10/22/2024 2318)  Verbalizes/displays adequate comfort level or baseline comfort level: Encourage patient to monitor pain and request assistance     Problem: Safety - Adult  Goal: Free from fall injury  Outcome: Progressing     Problem: ABCDS Injury Assessment  Goal: Absence of physical injury  Outcome: Progressing  Flowsheets (Taken 10/22/2024 2314)  Absence of Physical Injury: Implement safety measures based on patient assessment

## 2024-10-23 NOTE — ED NOTES
TRANSFER - OUT REPORT:    Verbal report given to Coral on Shamar Weber  being transferred to ICU for routine progression of patient care       Report consisted of patient's Situation, Background, Assessment and   Recommendations(SBAR).     Information from the following report(s) Nurse Handoff Report, ED Encounter Summary, ED SBAR, MAR, Recent Results, and Neuro Assessment was reviewed with the receiving nurse.    [place] Fall Assessment:    Presents to emergency department  because of falls (Syncope, seizure, or loss of consciousness): No  Age > 70: No  Altered Mental Status, Intoxication with alcohol or substance confusion (Disorientation, impaired judgment, poor safety awaremess, or inability to follow instructions): Yes  Impaired Mobility: Ambulates or transfers with assistive devices or assistance; Unable to ambulate or transer.: No  Nursing Judgement: No          Lines:   Peripheral IV 10/22/24 Posterior;Right Forearm (Active)   Site Assessment Clean, dry & intact 10/22/24 1746   Line Status Blood return noted 10/22/24 1746   Phlebitis Assessment No symptoms 10/22/24 1746   Infiltration Assessment 0 10/22/24 1746        Opportunity for questions and clarification was provided.      Patient transported with:  Monitor and Tech

## 2024-10-23 NOTE — H&P
History and Physical    Subjective:     Shamar Weber is a 43 y.o. male  with past medical history significant for alcohol use disorder, cocaine and tobacco use, anxiety and depression who presents to the ED tonight requesting for detox.  Patient states that he actually has been accepted at a detox facility somewhere in [facility] and was supposed to present there in the morning but because he was having tremors and shakiness, he was told to come to the ED first for treatment before coming to detox in a.m. he otherwise denies any acute complaints such as chest pain, shortness of breath, fever, chills, nausea, vomiting, abdominal pain or discomfort.  He complains of some numbness in his leg otherwise no other complaints voiced.  He is currently not on any medications.  He admits to chewing tobacco, using cocaine, and drinking a liter of \"everything alcohol\" daily.  At the time of my evaluation, he smells of alcohol.  In the ED his Etoh level was critical at 450. His liver enzymes are elevated.    We agreed to observation admit criteria for alcohol intoxication/withdrawal.  Estimated LOS: 1-2 midnight.    Past Medical History:   Diagnosis Date    Alcohol use disorder, severe, dependence (HCC) 5/4/2018    Alcohol withdrawal, uncomplicated ([facility]CC) 5/5/2018    Arthritis     Cannabis use disorder, mild, abuse 5/5/2018    Depressive episode 5/5/2018    Hypercholesterolemia     Psychotic disorder (HCC)     Tobacco use disorder, moderate, dependence 5/5/2018      Past Surgical History:   Procedure Laterality Date    CHOLECYSTECTOMY       Family History   Problem Relation Age of Onset    Diabetes Brother     Cancer Mother     Alcohol Abuse Mother     Alcohol Abuse Father     Heart Disease Father     Alcohol Abuse Sister       Social History     Tobacco Use    Smoking status: Never    Smokeless tobacco: Current     Types: Chew    Tobacco comments:     Quit smoking: chew tobacco   Substance Use Topics

## 2024-10-23 NOTE — DISCHARGE SUMMARY
Discharge Summary       PATIENT ID: Shamar Weber  MRN: [id number]   YOB: 1980    DATE OF ADMISSION: 10/22/2024  5:28 PM    DATE OF DISCHARGE: 10/23/24    PRIMARY CARE PROVIDER: Denia العلي APRN - CNP     ATTENDING PHYSICIAN: Nicholas Thomason MD  DISCHARGING PROVIDER: Nicholas Thomason MD        CONSULTATIONS: IP CONSULT TO SOCIAL WORK  IP CONSULT TO CASE MANAGEMENT    PROCEDURES/SURGERIES: * No surgery found *    ADMITTING DIAGNOSES & HOSPITAL COURSE:   Shamar Weber is a 43 y.o. male  with past medical history significant for alcohol use disorder, cocaine and tobacco use, anxiety and depression who presents to the ED tonight requesting for detox.  Patient states that he actually has been accepted at a detox facility somewhere in [facility] and was supposed to present there in the morning but because he was having tremors and shakiness, he was told to come to the ED first for treatment before coming to detox in a.m. he otherwise denies any acute complaints such as chest pain, shortness of breath, fever, chills, nausea, vomiting, abdominal pain or discomfort.  He complains of some numbness in his leg otherwise no other complaints voiced.  He is currently not on any medications.  He admits to chewing tobacco, using cocaine, and drinking a liter of \"everything alcohol\" daily.  At the time of my evaluation, he smells of alcohol.  In the ED his Etoh level was critical at 450. His liver enzymes are elevated.     We agreed to observation admit criteria for alcohol intoxication/withdrawal.  Estimated LOS: 1-2 midnight.        DISCHARGE DIAGNOSES / PLAN:      Assessment & Plan:      #1:  Alcohol use disorder, alcohol intoxication, POA:   -presents with etoh level is 450.   -admit to observation, telemetry  -give 1L NS bolus now  -start thiamine 100mg daily, multivitamin and folic acid daily.   -Librium 25mg po tid.   -ciwa assessments and lorazepam as needed per

## 2025-02-27 NOTE — H&P
Psychiatry History and Physical    Subjective:     Date of Evaluation:  5/5/2018    Reason for Referral:  Bj Grande was referred to the examiners from [facility] for ETOH Detox. History of Presenting Problem: 36y/o C male in NAD well developed and nourished. Reports this is his first detox admission. Medical problems: Depression, ETOH abuse. THC+    Patient Active Problem List    Diagnosis Date Noted    Alcohol abuse 05/04/2018     No past medical history on file. No past surgical history on file. No family history on file.    Social History   Substance Use Topics    Smoking status: Not on file    Smokeless tobacco: Not on file    Alcohol use Not on file     Prior to Admission medications    Not on File     No Known Allergies     Review of Systems - History obtained from chart review and the patient  General ROS: positive for  - sleep disturbance  Psychological ROS: positive for - depression and sleep disturbances  Respiratory ROS: no cough, shortness of breath, or wheezing  Cardiovascular ROS: no chest pain or dyspnea on exertion  Gastrointestinal ROS: no abdominal pain, change in bowel habits, or black or bloody stools  Genito-Urinary ROS: no dysuria, trouble voiding, or hematuria  Musculoskeletal ROS: positive for - muscle pain  Neurological ROS: positive for - tremors -fine  Dermatological ROS: negative      Objective:     Patient Vitals for the past 8 hrs:   BP Temp Pulse Resp   05/05/18 0820 (!) 139/92 98.6 °F (37 °C) 86 18       Mental Status exam: WNL except for    Sensorium  oriented to time, place and person   Orientation person, place, time/date and situation   Relations cooperative   Eye Contact appropriate   Appearance:  age appropriate and within normal Limits   Motor Behavior:  gait stable and within normal limits   Speech:  normal volume   Vocabulary average   Thought Process: logical   Thought Content free of delusions and free of hallucinations   Suicidal ideations no plan  and no intention Homicidal ideations no plan  and no intention   Mood:  depressed   Affect:  depressed   Memory recent  adequate   Memory remote:  adequate   Concentration:  adequate   Abstraction:  concrete   Insight:  good   Reliability good   Judgment:  good         Physical Exam:   Visit Vitals    BP (!) 139/92 (BP 1 Location: Left arm, BP Patient Position: Sitting)    Pulse 86    Temp 98.6 °F (37 °C)    Resp 18    Ht 6' (1.829 m)    Wt 61.2 kg (135 lb)    SpO2 96%    BMI 18.31 kg/m2     General appearance: alert, cooperative, no distress, appears stated age  Head: Normocephalic, without obvious abnormality, atraumatic  Eyes: negative  Ears: normal TM's and external ear canals AU  Nose: Nares normal. Septum midline. Mucosa normal. No drainage or sinus tenderness. Throat: Lips, mucosa, and tongue normal. Teeth and gums normal  Neck: supple, symmetrical, trachea midline, no adenopathy, thyroid: not enlarged, symmetric, no tenderness/mass/nodules, no carotid bruit and no JVD  Back: symmetric, no curvature. ROM normal. No CVA tenderness. Lungs: clear to auscultation bilaterally  Chest wall: no tenderness  Heart: regular rate and rhythm, S1, S2 normal, no murmur, click, rub or gallop  Abdomen: soft, non-tender. Bowel sounds normal. No masses,  no organomegaly  Extremities: extremities normal, atraumatic, no cyanosis or edema  Pulses: 2+ and symmetric  Skin: Skin color, texture, turgor normal. No rashes or lesions  Lymph nodes: Cervical, supraclavicular, and axillary nodes normal.  Neurologic: Grossly normal        Impression:      Active Problems:    Alcohol abuse (5/4/2018)          Plan:     Recommendations for Treatment/Conditions:  Psychiatric treatment recommended while in hospital  Admit to Psych services for ETOH abuse                                                  Depression    Referral To: Inpatient psychiatric care    Competency Statement:    At the current time, the patient is competent to make informed consent regarding their current medical care and discharge planning and/or financial decisions.       [employer], [address]   5/5/2018 8:21 AM Statement Selected

## 2025-07-09 ENCOUNTER — TRANSCRIBE ORDERS (OUTPATIENT)
Facility: HOSPITAL | Age: 45
End: 2025-07-09

## 2025-07-09 DIAGNOSIS — G62.9 NEUROPATHY: Primary | ICD-10-CM

## 2025-09-03 ENCOUNTER — TELEPHONE (OUTPATIENT)
Age: 45
End: 2025-09-03